# Patient Record
Sex: MALE | Race: BLACK OR AFRICAN AMERICAN | Employment: FULL TIME | URBAN - METROPOLITAN AREA
[De-identification: names, ages, dates, MRNs, and addresses within clinical notes are randomized per-mention and may not be internally consistent; named-entity substitution may affect disease eponyms.]

---

## 2017-02-02 ENCOUNTER — TELEPHONE (OUTPATIENT)
Dept: OTHER | Facility: CLINIC | Age: 37
End: 2017-02-02

## 2017-02-02 NOTE — TELEPHONE ENCOUNTER
2/2/2017    Call Regarding [employer]    Attempt 1    Message on voicemail     Comments: spouse and 3 child dep        Outreach   Amaya Parsons

## 2017-03-08 NOTE — TELEPHONE ENCOUNTER
3/8/2017    Call Regarding [employer]boarding [employer]    Attempt 2    Message on voicemail     Comments: Dep--- spouse and 3 roque        Outreach   cnt

## 2017-06-02 ENCOUNTER — RADIANT APPOINTMENT (OUTPATIENT)
Dept: GENERAL RADIOLOGY | Facility: CLINIC | Age: 37
End: 2017-06-02
Attending: INTERNAL MEDICINE
Payer: COMMERCIAL

## 2017-06-02 ENCOUNTER — OFFICE VISIT (OUTPATIENT)
Dept: PEDIATRICS | Facility: CLINIC | Age: 37
End: 2017-06-02
Payer: COMMERCIAL

## 2017-06-02 VITALS
HEIGHT: 71 IN | BODY MASS INDEX: 23.8 KG/M2 | DIASTOLIC BLOOD PRESSURE: 70 MMHG | HEART RATE: 64 BPM | RESPIRATION RATE: 20 BRPM | SYSTOLIC BLOOD PRESSURE: 112 MMHG | TEMPERATURE: 98.4 F | WEIGHT: 170 LBS

## 2017-06-02 DIAGNOSIS — M53.3 SACROILIAC JOINT PAIN: ICD-10-CM

## 2017-06-02 DIAGNOSIS — M54.5 LOW BACK PAIN, UNSPECIFIED BACK PAIN LATERALITY, UNSPECIFIED CHRONICITY, WITH SCIATICA PRESENCE UNSPECIFIED: ICD-10-CM

## 2017-06-02 DIAGNOSIS — M54.5 LOW BACK PAIN, UNSPECIFIED BACK PAIN LATERALITY, UNSPECIFIED CHRONICITY, WITH SCIATICA PRESENCE UNSPECIFIED: Primary | ICD-10-CM

## 2017-06-02 PROCEDURE — 99213 OFFICE O/P EST LOW 20 MIN: CPT | Performed by: INTERNAL MEDICINE

## 2017-06-02 PROCEDURE — 72200 X-RAY EXAM SI JOINTS: CPT

## 2017-06-02 PROCEDURE — 72100 X-RAY EXAM L-S SPINE 2/3 VWS: CPT

## 2017-06-02 NOTE — MR AVS SNAPSHOT
After Visit Summary   6/2/2017    Ti Peoples    MRN: [id number]           Patient Information     Date Of Birth          1980        Visit Information        Provider Department      6/2/2017 2:40 PM Ovi Hunter MD [facility]        Today's Diagnoses     Low back pain, unspecified back pain laterality, unspecified chronicity, with sciatica presence unspecified    -  1    Sacroiliac joint pain          Care Instructions                     Acute Low Back Pain: Self Care at Home Instructions  Conservative Treatment    Remaining active supports a quicker recovery, keep moving. (ex. Walking, increase time & speed as tolerated).    Bed rest is not recommended. Minimize sitting. Do not remain in one position for extended periods of time.    Maintain routine activity with attention to correct posture; stop aggravating activities.    Over-the-counter pain medications for short term symptom control. (See below)    Muscle relaxants are sometimes helpful for few days, but may cause drowsiness. (See below)    Cold packs or heat based upon your preference.    Most people improve within 2 weeks; most have significant improvement within 4 weeks.    If symptoms worsen or you experience numbness, contact your provider immediately.    Medications for Pain Relief  Recommended over-the-counter non-steroidal anti-inflammatories:     Ibuprofen (Advil, Motrin) 600 mg. three times a day as needed for pain      OR   Naproxen Sodium (Aleve) 220-440 mg. two times per day as needed for pain    If you have been prescribed a muscle relaxant (*cyclobenzapine 10 mg.)  Take    - 1 tablet at bedtime  *May cause drowsiness. Do not drive when taking this medication.    Low Back Pain Exercises   Exercises that stretch and strengthen the muscles of your abdomen and spine can help prevent back problems. If your back and abdominal muscles are strong, you can maintain good posture and keep your spine in its correct  position.     If your muscles are tight, take a warm shower or bath before doing the exercises. Exercise on a rug or mat. Stop doing any exercise that causes pain until you have talked with your provider.     These exercises are intended only as suggestions. Ask your provider or physical therapist to help you develop an exercise program. Check with your provider before starting the exercises. Ask your provider how many times a week you need to do the exercises.     Low Back Pain Exercises                                            Cat and camel: Get down on your hands and knees. Let your stomach sag, allowing your back to curve downward. Hold this position for 5 seconds. Then arch your back and hold for 5 seconds. Do 3 sets of 10.       Pelvic tilt: Lie on your back with your knees bent and your feet flat on the floor. Tighten your abdominal muscles and push your lower back into the floor. Hold this position for 5 seconds, then relax. Do 3 sets of 10.       Extension exercise: Lie face down on the floor for 5 minutes. If this hurts too much, lie face down with a pillow under your stomach. This should relieve your leg or back pain. When you can lie on your stomach for 5 minutes without a pillow, then you can continue with the rest of this exercise.     After lying on your stomach for 5 minutes, prop yourself up on your elbows for another 5 minutes. Lie flat again for 1 minute, then press down on your hands and extend your elbows while keeping your hips flat on the floor. Hold for 1 second and lower yourself to the floor. Repeat 10 times. Do 4 sets. Rest for 2 minutes between sets. You should have no pain in your legs when you do this, but it is normal to feel pain in your lower back. Do this several times a day.          Developed by [employer]   Published by [employer].   Last modified: 2009-02-08   Last reviewed: 2008-07-07   This content is reviewed periodically and is subject to change as new health information  "becomes available. The information is intended to inform and educate and is not a replacement for medical evaluation, advice, diagnosis or treatment by a healthcare professional.   Adult Health Advisor 2009.1 Index  Adult Health Advisor 2009.1 Credits     2009 [facility] and/or its affiliates. All Rights Reserved.             Follow-ups after your visit        Future tests that were ordered for you today     Open Future Orders        Priority Expected Expires Ordered    XR Lumbar Spine 2/3 Views Routine 6/2/2017 6/2/2018 6/2/2017    XR Sacroiliac Joint 1/2 Views Routine 6/2/2017 6/2/2018 6/2/2017            Who to contact     If you have questions or need follow up information about today's clinic visit or your schedule please contact [facility] directly at [phone].  Normal or non-critical lab and imaging results will be communicated to you by Podaddieshart, letter or phone within 4 business days after the clinic has received the results. If you do not hear from us within 7 days, please contact the clinic through Podaddieshart or phone. If you have a critical or abnormal lab result, we will notify you by phone as soon as possible.  Submit refill requests through Add2paper or call your pharmacy and they will forward the refill request to us. Please allow 3 business days for your refill to be completed.          Additional Information About Your Visit        Add2paper Information     Add2paper lets you send messages to your doctor, view your test results, renew your prescriptions, schedule appointments and more. To sign up, go to [email or web address] . Click on \"Log in\" on the left side of the screen, which will take you to the Welcome page. Then click on \"Sign up Now\" on the right side of the page.     You will be asked to enter the access code listed below, as well as some personal information. Please follow the directions to create your username and password.     Your access code is: [email or web address]  Expires: " "2017  3:12 PM     Your access code will  in 90 days. If you need help or a new code, please call your [facility] or [phone].        Care EveryWhere ID     This is your Care EveryWhere ID. This could be used by other organizations to access your [place] medical records  [id number]        Your Vitals Were     Pulse Temperature Respirations Height BMI (Body Mass Index)       64 98.4  F (36.9  C) (Oral) 20 5' 11\" (1.803 m) 23.71 kg/m2        Blood Pressure from Last 3 Encounters:   17 112/70   16 104/72   04/28/15 108/68    Weight from Last 3 Encounters:   17 170 lb (77.1 kg)   16 178 lb 9.6 oz (81 kg)   04/28/15 170 lb 9.6 oz (77.4 kg)               Primary Care Provider Office Phone # Fax #    Ovi Hunter [phone]       [facility] [address] [place] [address]        Thank you!     Thank you for choosing [facility]  for your care. Our goal is always to provide you with excellent care. Hearing back from our patients is one way we can continue to improve our services. Please take a few minutes to complete the written survey that you may receive in the mail after your visit with us. Thank you!             Your Updated Medication List - Protect others around you: Learn how to safely use, store and throw away your medicines at www.disposemymeds.org.      Notice  As of 2017  3:12 PM    You have not been prescribed any medications.      "

## 2017-06-02 NOTE — NURSING NOTE
"Chief Complaint   Patient presents with     Musculoskeletal Problem       Initial /70  Pulse 64  Temp 98.4  F (36.9  C) (Oral)  Resp 20  Ht 5' 11\" (1.803 m)  Wt 170 lb (77.1 kg)  BMI 23.71 kg/m2 Estimated body mass index is 23.71 kg/(m^2) as calculated from the following:    Height as of this encounter: 5' 11\" (1.803 m).    Weight as of this encounter: 170 lb (77.1 kg).  Medication Reconciliation: complete   Liudmila J, CMA,AAMA        "

## 2017-06-02 NOTE — LETTER
[address] [address]                  [phone]   June 13, 2017    Ti Beek  [address] [place] [address]      Mr. Peoples,    Your imaging tests are enclosed. The xrays are within normal limits, this is good news! If you back pain is persisting, please let me know and the next step would be a referral to physical therapy for an evaluation.     Please let me know if you have any questions, concerns, or new issues to discuss.     Ovi Hunter M.D.  Internal Medicine-Pediatrics    Results for orders placed or performed in visit on 06/02/17   XR Sacroiliac Joint 1/2 Views    Narrative    BILATERAL SACROILIAC JOINTS 2 VIEWS  6/2/2017 3:38 PM    HISTORY:  Pain.    COMPARISON:  None.    FINDINGS:  No fracture or osseous lesion is seen. The joint spaces are  well preserved. No adjacent soft tissue pathology is seen.      Impression    IMPRESSION:  Unremarkable examination.    MARIO NGUYEN MD

## 2017-06-02 NOTE — PATIENT INSTRUCTIONS
Acute Low Back Pain: Self Care at Home Instructions  Conservative Treatment    Remaining active supports a quicker recovery, keep moving. (ex. Walking, increase time & speed as tolerated).    Bed rest is not recommended. Minimize sitting. Do not remain in one position for extended periods of time.    Maintain routine activity with attention to correct posture; stop aggravating activities.    Over-the-counter pain medications for short term symptom control. (See below)    Muscle relaxants are sometimes helpful for few days, but may cause drowsiness. (See below)    Cold packs or heat based upon your preference.    Most people improve within 2 weeks; most have significant improvement within 4 weeks.    If symptoms worsen or you experience numbness, contact your provider immediately.    Medications for Pain Relief  Recommended over-the-counter non-steroidal anti-inflammatories:     Ibuprofen (Advil, Motrin) 600 mg. three times a day as needed for pain      OR   Naproxen Sodium (Aleve) 220-440 mg. two times per day as needed for pain    If you have been prescribed a muscle relaxant (*cyclobenzapine 10 mg.)  Take    - 1 tablet at bedtime  *May cause drowsiness. Do not drive when taking this medication.    Low Back Pain Exercises   Exercises that stretch and strengthen the muscles of your abdomen and spine can help prevent back problems. If your back and abdominal muscles are strong, you can maintain good posture and keep your spine in its correct position.     If your muscles are tight, take a warm shower or bath before doing the exercises. Exercise on a rug or mat. Stop doing any exercise that causes pain until you have talked with your provider.     These exercises are intended only as suggestions. Ask your provider or physical therapist to help you develop an exercise program. Check with your provider before starting the exercises. Ask your provider how many times a week you need to do the exercises.      Low Back Pain Exercises                                            Cat and camel: Get down on your hands and knees. Let your stomach sag, allowing your back to curve downward. Hold this position for 5 seconds. Then arch your back and hold for 5 seconds. Do 3 sets of 10.       Pelvic tilt: Lie on your back with your knees bent and your feet flat on the floor. Tighten your abdominal muscles and push your lower back into the floor. Hold this position for 5 seconds, then relax. Do 3 sets of 10.       Extension exercise: Lie face down on the floor for 5 minutes. If this hurts too much, lie face down with a pillow under your stomach. This should relieve your leg or back pain. When you can lie on your stomach for 5 minutes without a pillow, then you can continue with the rest of this exercise.     After lying on your stomach for 5 minutes, prop yourself up on your elbows for another 5 minutes. Lie flat again for 1 minute, then press down on your hands and extend your elbows while keeping your hips flat on the floor. Hold for 1 second and lower yourself to the floor. Repeat 10 times. Do 4 sets. Rest for 2 minutes between sets. You should have no pain in your legs when you do this, but it is normal to feel pain in your lower back. Do this several times a day.          Developed by [employer]   Published by [employer].   Last modified: 2009-02-08   Last reviewed: 2008-07-07   This content is reviewed periodically and is subject to change as new health information becomes available. The information is intended to inform and educate and is not a replacement for medical evaluation, advice, diagnosis or treatment by a healthcare professional.   Adult Health Advisor 2009.1 Index  Adult Health Advisor 2009.1 Credits     2009 [facility] and/or its affiliates. All Rights Reserved.

## 2017-06-02 NOTE — PROGRESS NOTES
"SUBJECTIVE:                                                    Ti Peoples is a 36 year old male who presents to clinic today for the following health issues:    Muscle and joint pain      Duration: -2 months    Description (location/character/radiation): bilateral hip pain    Intensity:  moderate    Accompanying signs and symptoms: off/on    History (similar episodes/previous evaluation): none contributing factors    Precipitating or alleviating factors: None    Therapies tried and outcome: asprin was effective     patient here for back pain, has had for about a year. not sure what activates it, but when it flares he is very hurts. is able to work when he has it, getting out of bed and going down the stairs hurts too. sometimes better after sleeping, sometimes comes and goes. did try ibuprofen once and it did seem to help that day. no history of injury or trauma. gets a few times a month, no swelling. nno numbness or tingling in the legs. patient has no symptoms today, does get symptoms a fw times a month.       Problem list and histories reviewed & adjusted, as indicated.  Additional history: as documented    Patient Active Problem List    Diagnosis Date Noted     CARDIOVASCULAR SCREENING; LDL GOAL LESS THAN 160 10/31/2010     Social History   Substance Use Topics     Smoking status: Never Smoker     Smokeless tobacco: Never Used      Comment: second hand smoke     Alcohol use Yes      Comment: socially     Family History   Problem Relation Age of Onset     CANCER Maternal Grandmother        ROS:  A complete 10 point review of systems was taken and negative except for those noted in the subjective/HPI section(s) above     Problem list, Medication list, Allergies, and Medical/Social/Surgical histories reviewed in EPIC and updated as appropriate.    OBJECTIVE:                                                    /70  Pulse 64  Temp 98.4  F (36.9  C) (Oral)  Resp 20  Ht 5' 11\" (1.803 m)  Wt 170 lb (77.1 kg) "  BMI 23.71 kg/m2   General examGENERAL APPEARANCE: healthy, alert and no distress, PSYCH: Affect Normal/Bright.  Mentation within normal limits, NECK: supple, non-tender to palpation, no adenopathy noted,   Back Exam: normal, full range of motion. no pain with palpation. no pain with straight leg raise. DTR's, motor strength and sensation normal, including heel and toe gait.  Peripheral pulses are palpable.     LUMBAR SPINE TWO-THREE VIEWS  6/2/2017 3:39 PM      HISTORY: Low back pain, suspect sacroiliac joint issue. Low back pain.  Sacrococcygeal disorders, not elsewhere classified.     COMPARISON: None.         IMPRESSION: Lumbar vertebrae are normally aligned. No compression  deformity or acute fracture. SI joints intact.     PORFIRIO MEJIA MD        BILATERAL SACROILIAC JOINTS 2 VIEWS  6/2/2017 3:38 PM     HISTORY:  Pain.     COMPARISON:  None.     FINDINGS:  No fracture or osseous lesion is seen. The joint spaces are  well preserved. No adjacent soft tissue pathology is seen.         IMPRESSION:  Unremarkable examination.     MAROI NGUYEN MD     ASSESSMENT/PLAN:                                                        ICD-10-CM    1. Low back pain, unspecified back pain laterality, unspecified chronicity, with sciatica presence unspecified M54.5 XR Lumbar Spine 2/3 Views     XR Sacroiliac Joint 1/2 Views   2. Sacroiliac joint pain M53.3 XR Lumbar Spine 2/3 Views     XR Sacroiliac Joint 1/2 Views   discussed with patient (or patient's parents/caregiver) pathophysiology of condition and treatment options.  will get xrays to evaluation for spondyarthropahy, et given patient's age. likely strain and reviewed supportive cares, RICE, etc. if persits, physical therapy evaluation would be good next step. if worse would refer to ortho for evaluation. Patient verbalized understanding and is agreeable to this plan.         Estimated body mass index is 23.71 kg/(m^2) as calculated from the following:    Height as of  "this encounter: 5' 11\" (1.803 m).    Weight as of this encounter: 170 lb (77.1 kg).      Return to clinic as needed or if symptoms persist, change, worsen or if any new symptoms develop.    Ovi Hunter M.D.  Internal Medicine-Pediatrics              "

## 2018-05-01 ENCOUNTER — VIRTUAL VISIT (OUTPATIENT)
Dept: FAMILY MEDICINE | Facility: OTHER | Age: 38
End: 2018-05-01

## 2018-05-01 ENCOUNTER — TELEPHONE (OUTPATIENT)
Dept: PEDIATRICS | Facility: CLINIC | Age: 38
End: 2018-05-01

## 2018-05-01 DIAGNOSIS — H10.32 ACUTE CONJUNCTIVITIS OF LEFT EYE: Primary | ICD-10-CM

## 2018-05-01 RX ORDER — POLYMYXIN B SULFATE AND TRIMETHOPRIM 1; 10000 MG/ML; [USP'U]/ML
1 SOLUTION OPHTHALMIC 4 TIMES DAILY
Qty: 10 ML | Refills: 0 | Status: SHIPPED | OUTPATIENT
Start: 2018-05-01 | End: 2018-05-08

## 2018-05-01 NOTE — PROGRESS NOTES
"Date:   Clinician: Nallely De Jesus  Clinician NPI: [id number]  Patient: Ti Peoples  Patient : 1980  Patient Address: [address] [place] [address]  Patient Phone: [phone]  Visit Protocol: Eye conditions  Patient Summary:  Ti is a 37 year old (: 1980 ) male who initiated a Visit for conjunctivitis.  When asked the question \"Please sign me up to receive news, health information and promotions from [employer].\", Ti responded \"No\".    Images of his eye condition were uploaded.   His symptoms started 1-2 days ago and affect the left eye. The symptoms consist of eye pain, drainage coming from the eye(s), eyelid swelling, and eye redness. Additionally, his vision has become more blurry since his symptoms started, but it's possible the blurry vision is caused by the eyelid swelling and/or drainage coming from the eye(s).   Symptom details     Drainage: The color of the drainage coming out of his eye(s) is yellow. The drainage is thick and causes his eyelids to be stuck shut in the morning.    Eye pain: He is experiencing mild eye pain. He has not taken over-the-counter medication for the pain.     Denied symptoms include itchy eye(s), light sensitivity, and bumps on the eyelid. Ti does not have subconjunctival hemorrhage. He does not feel feverish.   In the past 2 weeks, he has had a cold or an ear infection.   Ti has not had a recent diagnosis of conjunctivitis. He also has not had a recent eye injury, foreign body in the eye(s), and eye surgery. It is not known if Ti has recently been exposed to someone with a red eye or an eye infection. He does not wear contact lenses. Ti has not ever been diagnosed with glaucoma.   Ti is not taking medication to treat his current symptoms.   Ti does not require proof of evaluation of his eye condition before returning to school, work, or .   Ti does not smoke or use smokeless tobacco.   Additional " information as reported by the patient (free text): Irritation started after swimming in the ocean and getting salt water in the eyes  MEDICATIONS:  No current medications  , ALLERGIES:   NKDA    Clinician Response:  Dear Ti,  I am sorry you are not feeling well. Your health is our priority. To determine the most appropriate care for you, I would like you to be seen in person to further discuss your health history and symptoms.  You will not be charged for this Visit. Thank you for trusting us with your care.   Diagnosis: Refer for additional evaluation  Diagnosis ICD: R69  Diagnosis ICD: 462.0

## 2018-05-01 NOTE — TELEPHONE ENCOUNTER
Wife sent [employer] message regarding  in her account/chart.     Called wife, patient is out fishing.     Reviewed [facility] symptoms review with wife. She reports no change in symptoms other than he is trying not to rub his eye and irritate it further.     Will discuss with Dr. Hunter.

## 2018-06-20 ENCOUNTER — HOSPITAL ENCOUNTER (EMERGENCY)
Facility: CLINIC | Age: 38
Discharge: HOME | End: 2018-06-20
Attending: EMERGENCY MEDICINE | Admitting: EMERGENCY MEDICINE
Payer: COMMERCIAL

## 2018-06-20 ENCOUNTER — APPOINTMENT (OUTPATIENT)
Dept: GENERAL RADIOLOGY | Facility: CLINIC | Age: 38
End: 2018-06-20
Attending: EMERGENCY MEDICINE
Payer: COMMERCIAL

## 2018-06-20 VITALS
RESPIRATION RATE: 18 BRPM | HEIGHT: 71 IN | TEMPERATURE: 100.3 F | BODY MASS INDEX: 23.1 KG/M2 | SYSTOLIC BLOOD PRESSURE: 117 MMHG | HEART RATE: 74 BPM | WEIGHT: 165 LBS | OXYGEN SATURATION: 98 % | DIASTOLIC BLOOD PRESSURE: 71 MMHG

## 2018-06-20 DIAGNOSIS — R50.9 FEVER, UNSPECIFIED FEVER CAUSE: ICD-10-CM

## 2018-06-20 DIAGNOSIS — M79.10 MYALGIA: ICD-10-CM

## 2018-06-20 LAB
A PHAGOCYTOPH IGG TITR SER IF: NORMAL {TITER}
A PHAGOCYTOPH IGM TITR SER IF: NORMAL {TITER}
ALBUMIN SERPL-MCNC: 3.2 G/DL (ref 3.4–5)
ALBUMIN UR-MCNC: >499 MG/DL
ALP SERPL-CCNC: 112 U/L (ref 40–150)
ALT SERPL W P-5'-P-CCNC: 59 U/L (ref 0–70)
ANION GAP SERPL CALCULATED.3IONS-SCNC: 8 MMOL/L (ref 3–14)
APPEARANCE UR: ABNORMAL
AST SERPL W P-5'-P-CCNC: 54 U/L (ref 0–45)
BASOPHILS # BLD AUTO: 0 10E9/L (ref 0–0.2)
BASOPHILS NFR BLD AUTO: 0 %
BILIRUB DIRECT SERPL-MCNC: 0.3 MG/DL (ref 0–0.2)
BILIRUB SERPL-MCNC: 1.1 MG/DL (ref 0.2–1.3)
BILIRUB UR QL STRIP: ABNORMAL
BUN SERPL-MCNC: 12 MG/DL (ref 7–30)
CALCIUM SERPL-MCNC: 8.5 MG/DL (ref 8.5–10.1)
CHLORIDE SERPL-SCNC: 99 MMOL/L (ref 94–109)
CO2 BLDCOV-SCNC: 26 MMOL/L (ref 21–28)
CO2 SERPL-SCNC: 24 MMOL/L (ref 20–32)
COLOR UR AUTO: ABNORMAL
CREAT SERPL-MCNC: 0.93 MG/DL (ref 0.66–1.25)
DIFFERENTIAL METHOD BLD: ABNORMAL
EOSINOPHIL # BLD AUTO: 0 10E9/L (ref 0–0.7)
EOSINOPHIL NFR BLD AUTO: 0 %
ERYTHROCYTE [DISTWIDTH] IN BLOOD BY AUTOMATED COUNT: 13.3 % (ref 10–15)
GFR SERPL CREATININE-BSD FRML MDRD: >90 ML/MIN/1.7M2
GLUCOSE SERPL-MCNC: 119 MG/DL (ref 70–99)
GLUCOSE UR STRIP-MCNC: NEGATIVE MG/DL
HCT VFR BLD AUTO: 42.4 % (ref 40–53)
HGB BLD-MCNC: 14.6 G/DL (ref 13.3–17.7)
HGB UR QL STRIP: ABNORMAL
KETONES UR STRIP-MCNC: 20 MG/DL
LACTATE BLD-SCNC: 0.8 MMOL/L (ref 0.7–2)
LACTATE BLD-SCNC: 2.1 MMOL/L (ref 0.7–2.1)
LEUKOCYTE ESTERASE UR QL STRIP: NEGATIVE
LIPASE SERPL-CCNC: 84 U/L (ref 73–393)
LYMPHOCYTES # BLD AUTO: 0.4 10E9/L (ref 0.8–5.3)
LYMPHOCYTES NFR BLD AUTO: 12 %
MCH RBC QN AUTO: 30.5 PG (ref 26.5–33)
MCHC RBC AUTO-ENTMCNC: 34.4 G/DL (ref 31.5–36.5)
MCV RBC AUTO: 89 FL (ref 78–100)
METAMYELOCYTES # BLD: 0 10E9/L
METAMYELOCYTES NFR BLD MANUAL: 1 %
MONOCYTES # BLD AUTO: 0.1 10E9/L (ref 0–1.3)
MONOCYTES NFR BLD AUTO: 4 %
MYELOCYTES # BLD: 0 10E9/L
MYELOCYTES NFR BLD MANUAL: 1 %
NEUTROPHILS # BLD AUTO: 2.6 10E9/L (ref 1.6–8.3)
NEUTROPHILS NFR BLD AUTO: 82 %
NITRATE UR QL: NEGATIVE
PCO2 BLDV: 31 MM HG (ref 40–50)
PH BLDV: 7.52 PH (ref 7.32–7.43)
PH UR STRIP: 8 PH (ref 5–7)
PLATELET # BLD AUTO: 65 10E9/L (ref 150–450)
PLATELET # BLD EST: ABNORMAL 10*3/UL
PO2 BLDV: 20 MM HG (ref 25–47)
POTASSIUM SERPL-SCNC: 3.4 MMOL/L (ref 3.4–5.3)
POTASSIUM SERPL-SCNC: ABNORMAL MMOL/L (ref 3.4–5.3)
PROT SERPL-MCNC: 6.1 G/DL (ref 6.8–8.8)
RBC # BLD AUTO: 4.78 10E12/L (ref 4.4–5.9)
RBC #/AREA URNS AUTO: >182 /HPF (ref 0–2)
RBC MORPH BLD: ABNORMAL
SAO2 % BLDV FROM PO2: 41 %
SODIUM SERPL-SCNC: 131 MMOL/L (ref 133–144)
SOURCE: ABNORMAL
SP GR UR STRIP: 1.03 (ref 1–1.03)
UROBILINOGEN UR STRIP-MCNC: 4 MG/DL (ref 0–2)
WBC # BLD AUTO: 3.2 10E9/L (ref 4–11)
WBC #/AREA URNS AUTO: 3 /HPF (ref 0–5)

## 2018-06-20 PROCEDURE — 25000128 H RX IP 250 OP 636: Performed by: EMERGENCY MEDICINE

## 2018-06-20 PROCEDURE — 96374 THER/PROPH/DIAG INJ IV PUSH: CPT

## 2018-06-20 PROCEDURE — 87040 BLOOD CULTURE FOR BACTERIA: CPT | Performed by: EMERGENCY MEDICINE

## 2018-06-20 PROCEDURE — 80076 HEPATIC FUNCTION PANEL: CPT | Performed by: EMERGENCY MEDICINE

## 2018-06-20 PROCEDURE — 36415 COLL VENOUS BLD VENIPUNCTURE: CPT | Performed by: EMERGENCY MEDICINE

## 2018-06-20 PROCEDURE — 83605 ASSAY OF LACTIC ACID: CPT

## 2018-06-20 PROCEDURE — 96375 TX/PRO/DX INJ NEW DRUG ADDON: CPT

## 2018-06-20 PROCEDURE — 81001 URINALYSIS AUTO W/SCOPE: CPT | Performed by: EMERGENCY MEDICINE

## 2018-06-20 PROCEDURE — 25000132 ZZH RX MED GY IP 250 OP 250 PS 637: Performed by: EMERGENCY MEDICINE

## 2018-06-20 PROCEDURE — 71046 X-RAY EXAM CHEST 2 VIEWS: CPT

## 2018-06-20 PROCEDURE — 96361 HYDRATE IV INFUSION ADD-ON: CPT

## 2018-06-20 PROCEDURE — 85025 COMPLETE CBC W/AUTO DIFF WBC: CPT | Performed by: EMERGENCY MEDICINE

## 2018-06-20 PROCEDURE — 80048 BASIC METABOLIC PNL TOTAL CA: CPT | Performed by: EMERGENCY MEDICINE

## 2018-06-20 PROCEDURE — 83690 ASSAY OF LIPASE: CPT | Performed by: EMERGENCY MEDICINE

## 2018-06-20 PROCEDURE — 86666 EHRLICHIA ANTIBODY: CPT | Performed by: EMERGENCY MEDICINE

## 2018-06-20 PROCEDURE — 99284 EMERGENCY DEPT VISIT MOD MDM: CPT | Mod: 25

## 2018-06-20 PROCEDURE — 82803 BLOOD GASES ANY COMBINATION: CPT

## 2018-06-20 PROCEDURE — 84132 ASSAY OF SERUM POTASSIUM: CPT | Performed by: EMERGENCY MEDICINE

## 2018-06-20 PROCEDURE — 83605 ASSAY OF LACTIC ACID: CPT | Performed by: EMERGENCY MEDICINE

## 2018-06-20 RX ORDER — DOXYCYCLINE HYCLATE 100 MG
100 TABLET ORAL ONCE
Status: COMPLETED | OUTPATIENT
Start: 2018-06-20 | End: 2018-06-20

## 2018-06-20 RX ORDER — ACETAMINOPHEN 500 MG
1000 TABLET ORAL ONCE
Status: COMPLETED | OUTPATIENT
Start: 2018-06-20 | End: 2018-06-20

## 2018-06-20 RX ORDER — KETOROLAC TROMETHAMINE 15 MG/ML
15 INJECTION, SOLUTION INTRAMUSCULAR; INTRAVENOUS ONCE
Status: COMPLETED | OUTPATIENT
Start: 2018-06-20 | End: 2018-06-20

## 2018-06-20 RX ORDER — ONDANSETRON 2 MG/ML
4 INJECTION INTRAMUSCULAR; INTRAVENOUS
Status: DISCONTINUED | OUTPATIENT
Start: 2018-06-20 | End: 2018-06-20 | Stop reason: HOSPADM

## 2018-06-20 RX ORDER — DOXYCYCLINE 100 MG/1
100 CAPSULE ORAL 2 TIMES DAILY
Qty: 20 CAPSULE | Refills: 0 | Status: SHIPPED | OUTPATIENT
Start: 2018-06-20 | End: 2018-06-30

## 2018-06-20 RX ADMIN — ONDANSETRON 4 MG: 2 INJECTION INTRAMUSCULAR; INTRAVENOUS at 07:42

## 2018-06-20 RX ADMIN — KETOROLAC TROMETHAMINE 15 MG: 15 INJECTION, SOLUTION INTRAMUSCULAR; INTRAVENOUS at 07:42

## 2018-06-20 RX ADMIN — ACETAMINOPHEN 1000 MG: 500 TABLET, FILM COATED ORAL at 08:00

## 2018-06-20 RX ADMIN — SODIUM CHLORIDE 1000 ML: 9 INJECTION, SOLUTION INTRAVENOUS at 07:45

## 2018-06-20 RX ADMIN — DOXYCYCLINE HYCLATE 100 MG: 100 TABLET, FILM COATED ORAL at 09:54

## 2018-06-20 RX ADMIN — SODIUM CHLORIDE 1000 ML: 9 INJECTION, SOLUTION INTRAVENOUS at 09:53

## 2018-06-20 ASSESSMENT — ENCOUNTER SYMPTOMS
FEVER: 1
RHINORRHEA: 0
ABDOMINAL PAIN: 1
CONSTIPATION: 0
DIAPHORESIS: 1
NAUSEA: 1
DYSURIA: 0
HEMATURIA: 0
VOMITING: 0
DIARRHEA: 0
ARTHRALGIAS: 1
COUGH: 0
HEADACHES: 1
MYALGIAS: 1

## 2018-06-20 NOTE — LETTER
June 20, 2018      To Whom It May Concern:      Ti Peoples was seen in our Emergency Department today, 06/20/18.  I expect his condition to improve over the next 2-3 days.  He may return to work when improved.    Sincerely,        Araceli Johnston RN

## 2018-06-20 NOTE — ED AVS SNAPSHOT
[facility] Emergency Department    [address] [place] [address]    Phone:  [phone]    Fax:  [phone]                                       Ti Peoples   MRN: [id number]    Department:  [facility] Emergency Department   Date of Visit:  6/20/2018           Patient Information     Date Of Birth          1980        Your diagnoses for this visit were:     Fever, unspecified fever cause possible Anaplasmosis    Myalgia        You were seen by Juan Alberto Carr MD.      Follow-up Information     Follow up with Ovi Hunter MD In 2 days.    Specialties:  Internal Medicine, Pediatrics    Contact information:    [facility] [place] [address]  [phone]          Discharge Instructions         Tick Facts    Ticks are small arachnids that feed on the blood of rodents, rabbits, birds, deer, dogs, and humans. Ticks do not fly and do not drop from trees. They climb to the tips of plants along trails and attach themselves to you as you brush against the plant. Ticks may also attach to you if you come in contact with an infested animal.  Avoiding ticks  The following tips will help you avoid ticks:    When walking in tick-infested areas, tuck your pants into your boots or socks, and tuck your shirt into your pants.    Wear light-colored clothing so you can easily see any ticks that get on you.    Apply a tick repellent to pants, socks, and shoes.    Avoid brushing against the plants along trails.    Check yourself and your children at the end of each day when hiking through tick-infested areas. Don't forget to check in your hair as well.  Removing ticks  Removing ticks right away will reduce the chance of disease. Here are some tips for removing ticks:    If possible, have someone else remove the tick from you.    Protect your hands from exposure to the tick by using a tissue or rubber glove.    Ticks have hook-like barbs on their mouth,  which they use to attach themselves. Use tweezers or small needle-nose pliers instead of your fingers when removing a tick. Grasp the head as close to the skin as possible. Be careful not to squeeze the body, which would inject more fluid from the tick into your skin.    Pull gently and slowly away from skin until the mouthparts let go. If the tick fails to let go, stop pulling. While holding the head with tweezers, slowly turn it 90 degrees. Then, gently pull away from the skin again. This movement may unlock the tick's jaw and make it easier to remove.    Once you have removed the tick, look closely at the bite area. If you think there are still parts of the tick in your skin that you can't remove, contact your doctor.    Wash your hands and the bite site with soap and water.  Do not:    Crush or squeeze the tick with the tweezers.    Jerk the tick.    Burn or prick the tick.    Try to suffocate the tick with petroleum jelly or nail polish.  Identifying ticks  Most tick bites are harmless. But some ticks carry diseases, such as Lyme disease and Jarek Mountain spotted fever. These can spread to people. Lyme disease is of greatest concern. It is carried by only one type of tick, the deer tick. Many public health departments are able to identify a tick to figure out if it is the type that causes Lyme disease. If this service is available in your area, bring the removed tick in a zip-top bag or jar to the public health department for identification. If you cannot identify the tick and if you were bitten in an area of the country where there is a risk of Lyme disease, contact your doctor.  Date Last Reviewed: 9/1/2016 2000-2017 [employer]. [address] [place] [address], [place] [address]. All rights reserved. This information is not intended as a substitute for professional medical care. Always follow your healthcare professional's instructions.      Discharge Instructions  Fever    You have been seen today  for a fever. Fever is a normal body reaction to illness or inflammation. Fever is a sign that your body is doing what it should to fight something off. Fever is not dangerous, but it can make you feel miserable, and you will probably feel better if you get your fever to go down. Most infections are caused by a virus, and antibiotics will not help; your provider will tell you whether antibiotics are needed in your case. At this time your provider does not find that your fever is a sign of anything dangerous or life-threatening.  However, sometimes the signs of serious illness do not show up right away so additional care may be necessary.    Generally, every Emergency Department visit should have a follow-up clinic visit with either a primary or a specialty clinic/provider. Please follow-up as instructed by your emergency provider today.    What can I do to help myself?    Fill any prescriptions the provider gave you and take them right away--especially antibiotics.    If you have a fever, get plenty of rest and drink lots of fluids, especially water.    What clothes or blankets you have on will not change your fever. Do what is comfortable for you.    Bathing or sponging in lukewarm water may help you feel better.    Tylenol  (acetaminophen), Motrin  (ibuprofen), or Advil  (ibuprofen) help bring fever down and may help you feel more comfortable. Be sure to read and follow the package directions, and ask your provider if you have questions.    Do not drink alcohol.    Return to the Emergency Department if:    Any of the symptoms you have get much worse.    You seem very sick, like being too weak to get up.    You have any new symptoms, especially serious things like abdominal (belly) pain or chest pain.    You are short of breath.    You have a severe headache.    You are vomiting (throwing up) so much you cannot keep fluids or medicines down.    You have confusion or seem unusually drowsy.    You have a  seizure.    You have anything else that worries you.  If you were given a prescription for medicine here today, be sure to read all of the information (including the package insert) that comes with your prescription.  This will include important information about the medicine, its side effects, and any warnings that you need to know about.  The pharmacist who fills the prescription can provide more information and answer questions you may have about the medicine.  If you have questions or concerns that the pharmacist cannot address, please call or return to the Emergency Department.   Remember that you can always come back to the Emergency Department if you are not able to see your regular provider in the amount of time listed above, if you get any new symptoms, or if there is anything that worries you.        24 Hour Appointment Hotline       To make an appointment at any [facility], call [phone] ([phone]). If you don't have a family doctor or clinic, we will help you find one. [facility] clinics are conveniently located to serve the needs of you and your family.             Review of your medicines      START taking        Dose / Directions Last dose taken    doxycycline 100 MG capsule   Commonly known as:  VIBRAMYCIN   Dose:  100 mg   Quantity:  20 capsule        Take 1 capsule (100 mg) by mouth 2 times daily for 10 days   Refills:  0                Prescriptions were sent or printed at these locations (1 Prescription)                   Other Prescriptions                Printed at Department/Unit printer (1 of 1)         doxycycline (VIBRAMYCIN) 100 MG capsule                Procedures and tests performed during your visit     Procedure/Test Number of Times Performed    Anaplasma phagocytoph antibody IgG IgM 2    Basic metabolic panel (BMP) 1    Blood culture 2    CBC + differential 1    Hepatic panel 1    ISTAT CG4 gases lactate didier nursing POCT 1    ISTAT gases lactate didier POCT 1    Lactic  acid whole blood 1    Lipase 1    Potassium 1    UA with Microscopic 1    XR Chest 2 Views 1      Orders Needing Specimen Collection     None      Pending Results     Date and Time Order Name Status Description    6/20/2018 0918 Anaplasma phagocytoph antibody IgG IgM In process     6/20/2018 0748 Blood culture In process     6/20/2018 0721 Blood culture In process             Pending Culture Results     Date and Time Order Name Status Description    6/20/2018 0748 Blood culture In process     6/20/2018 0721 Blood culture In process             Pending Results Instructions     If you had any lab results that were not finalized at the time of your Discharge, you can call the ED Lab Result RN at [phone]. You will be contacted by this team for any positive Lab results or changes in treatment. The nurses are available 7 days a week from 10A to 6:30P.  You can leave a message 24 hours per day and they will return your call.        Test Results From Your Hospital Stay        6/20/2018  9:09 AM      Component Results     Component Value Ref Range & Units Status    WBC 3.2 (L) 4.0 - 11.0 10e9/L Final    RBC Count 4.78 4.4 - 5.9 10e12/L Final    Hemoglobin 14.6 13.3 - 17.7 g/dL Final    Hematocrit 42.4 40.0 - 53.0 % Final    MCV 89 78 - 100 fl Final    MCH 30.5 26.5 - 33.0 pg Final    MCHC 34.4 31.5 - 36.5 g/dL Final    RDW 13.3 10.0 - 15.0 % Final    Platelet Count 65 (L) 150 - 450 10e9/L Final    Diff Method Manual Differential  Final    % Neutrophils 82.0 % Final    Increased Bands    % Lymphocytes 12.0 % Final    % Monocytes 4.0 % Final    % Eosinophils 0.0 % Final    % Basophils 0.0 % Final    % Metamyelocytes 1.0 % Final    % Myelocytes 1.0 % Final    Absolute Neutrophil 2.6 1.6 - 8.3 10e9/L Final    Absolute Lymphocytes 0.4 (L) 0.8 - 5.3 10e9/L Final    Absolute Monocytes 0.1 0.0 - 1.3 10e9/L Final    Absolute Eosinophils 0.0 0.0 - 0.7 10e9/L Final    Absolute Basophils 0.0 0.0 - 0.2 10e9/L Final    Absolute  Metamyelocytes 0.0 0 10e9/L Final    Absolute Myelocytes 0.0 0 10e9/L Final    RBC Morphology   Final    Consistent with reported results    Platelet Estimate   Final    Automated count confirmed.  Platelet morphology is normal.         6/20/2018  8:46 AM      Component Results     Component Value Ref Range & Units Status    Sodium 131 (L) 133 - 144 mmol/L Final    Potassium Canceled, Test credited 3.4 - 5.3 mmol/L Final    Unsatisfactory specimen - hemolyzed    Chloride 99 94 - 109 mmol/L Final    Carbon Dioxide 24 20 - 32 mmol/L Final    Anion Gap 8 3 - 14 mmol/L Final    Glucose 119 (H) 70 - 99 mg/dL Final    Urea Nitrogen 12 7 - 30 mg/dL Final    Creatinine 0.93 0.66 - 1.25 mg/dL Final    GFR Estimate >90 >60 mL/min/1.7m2 Final    Non  GFR Calc    GFR Estimate If Black >90 >60 mL/min/1.7m2 Final    African American GFR Calc    Calcium 8.5 8.5 - 10.1 mg/dL Final         6/20/2018  8:23 AM      Narrative     XR CHEST 2 VW  6/20/2018 8:11 AM    HISTORY:  Fever.    COMPARISON:  None.        Impression     IMPRESSION:  Negative.     DEEPAK RAIN MD         6/20/2018  9:34 AM      Component Results     Component Value Ref Range & Units Status    Color Urine Brown  Final    Appearance Urine Cloudy  Final    Glucose Urine Negative NEG^Negative mg/dL Final    Bilirubin Urine Small (A) NEG^Negative Final    This is an unconfirmed screening test result. A positive result may be false.    Ketones Urine 20 (A) NEG^Negative mg/dL Final    Specific Gravity Urine 1.029 1.003 - 1.035 Final    Blood Urine Large (A) NEG^Negative Final    pH Urine 8.0 (H) 5.0 - 7.0 pH Final    Protein Albumin Urine >499 (A) NEG^Negative mg/dL Final    Reviewed, acceptable    Urobilinogen mg/dL 4.0 (H) 0.0 - 2.0 mg/dL Final    Nitrite Urine Negative NEG^Negative Final    Leukocyte Esterase Urine Negative NEG^Negative Final    Source Midstream Urine  Final    WBC Urine 3 0 - 5 /HPF Final    RBC Urine >182 (H) 0 - 2 /HPF Final          6/20/2018  8:08 AM         6/20/2018  8:06 AM               6/20/2018  7:53 AM      Component Results     Component Value Ref Range & Units Status    Ph Venous 7.52 (H) 7.32 - 7.43 pH Final    PCO2 Venous 31 (L) 40 - 50 mm Hg Final    PO2 Venous 20 (L) 25 - 47 mm Hg Final    Bicarbonate Venous 26 21 - 28 mmol/L Final    O2 Sat Venous 41 % Final    Lactic Acid 2.1 0.7 - 2.1 mmol/L Final         6/20/2018  9:49 AM      Component Results     Component Value Ref Range & Units Status    Bilirubin Direct 0.3 (H) 0.0 - 0.2 mg/dL Final    Bilirubin Total 1.1 0.2 - 1.3 mg/dL Final    Albumin 3.2 (L) 3.4 - 5.0 g/dL Final    Protein Total 6.1 (L) 6.8 - 8.8 g/dL Final    Alkaline Phosphatase 112 40 - 150 U/L Final    ALT 59 0 - 70 U/L Final    AST 54 (H) 0 - 45 U/L Final         6/20/2018  9:49 AM      Component Results     Component Value Ref Range & Units Status    Lipase 84 73 - 393 U/L Final         6/20/2018  9:47 AM      Component Results     Component Value Ref Range & Units Status    Potassium 3.4 3.4 - 5.3 mmol/L Final               6/20/2018  9:30 AM      Component Results     Component    A Phagocytophil IgG    Canceled, Test credited    Comment:    Unsatisfactory specimen - hemolyzed    A Phagocytophil IgM    Canceled, Test credited    Comment:    Unsatisfactory specimen - hemolyzed         6/20/2018  9:29 AM      Component Results     Component Value Ref Range & Units Status    Lactic Acid 0.8 0.7 - 2.0 mmol/L Final               6/20/2018 10:42 AM                Clinical Quality Measure: Blood Pressure Screening     Your blood pressure was checked while you were in the emergency department today. The last reading we obtained was  BP: 112/71 . Please read the guidelines below about what these numbers mean and what you should do about them.  If your systolic blood pressure (the top number) is less than 120 and your diastolic blood pressure (the bottom number) is less than 80, then your blood pressure is normal.  "There is nothing more that you need to do about it.  If your systolic blood pressure (the top number) is 120-139 or your diastolic blood pressure (the bottom number) is 80-89, your blood pressure may be higher than it should be. You should have your blood pressure rechecked within a year by a primary care provider.  If your systolic blood pressure (the top number) is 140 or greater or your diastolic blood pressure (the bottom number) is 90 or greater, you may have high blood pressure. High blood pressure is treatable, but if left untreated over time it can put you at risk for heart attack, stroke, or kidney failure. You should have your blood pressure rechecked by a primary care provider within the next 4 weeks.  If your provider in the emergency department today gave you specific instructions to follow-up with your doctor or provider even sooner than that, you should follow that instruction and not wait for up to 4 weeks for your follow-up visit.        Thank you for choosing [facility]       Thank you for choosing [facility] for your care. Our goal is always to provide you with excellent care. Hearing back from our patients is one way we can continue to improve our services. Please take a few minutes to complete the written survey that you may receive in the mail after you visit with us. Thank you!        [employer] Information     [employer] lets you send messages to your doctor, view your test results, renew your prescriptions, schedule appointments and more. To sign up, go to [email or web address] . Click on \"Log in\" on the left side of the screen, which will take you to the Welcome page. Then click on \"Sign up Now\" on the right side of the page.     You will be asked to enter the access code listed below, as well as some personal information. Please follow the directions to create your username and password.     Your access code is: [email or web address]  Expires: 2018 10:51 AM     Your access code will  in 90 days. " If you need help or a new code, please call your [place] clinic or [phone].        Care EveryWhere ID     This is your Care EveryWhere ID. This could be used by other organizations to access your [place] medical records  [id number]        Equal Access to Services     HESHAM ALCANTARA : Hawa Peralta, wajacquesda luqadaha, qaybta kaalmada lm, sondra rose. So [facility] [phone].    ATENCIÓN: Si habla español, tiene a jackson disposición servicios gratuitos de asistencia lingüística. Llame al [phone].    We comply with applicable federal civil rights laws and [place] laws. We do not discriminate on the basis of race, color, national origin, age, disability, sex, sexual orientation, or gender identity.            After Visit Summary       This is your record. Keep this with you and show to your community pharmacist(s) and doctor(s) at your next visit.

## 2018-06-20 NOTE — ED PROVIDER NOTES
History     Chief Complaint:  Fever    HPI   Ti Peoples is a 37 year old male who presents with fever    Sunday, came home had soup. Went to bed. Tylenol ibuprofen every few hours. The patient reports that he began feeling ill 4 days ago, when he came home from a weekend of work, ate dinner and went to bed. He has not eaten since. Last night he endorsed a fever of 101.3 F and this morning a fever of 102.4 F, prompting his visit to the ED. 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 He presents today with additional symptoms of mid-abdominal pain, occasional nausea, headache, general aches and sweating and he states that his stomach pain is exacerbated with movement. The patient denies diarrhea, vomiting, dysuria, hematuria, rash and sore throat.     Allergies:  No known allergies     Medications:    The patient is currently on no regular medications.    Past Medical History:    The patient does not have any past pertinent medical history.    Past Surgical History:    History reviewed. No pertinent surgical history.    Family History:    Cancer    Social History:  The patient was accompanied to the ED by his family.  Smoking Status: Never  Smokeless Tobacco: No   Alcohol Use: Yes  Marital Status:        Review of Systems   Constitutional: Positive for diaphoresis and fever.   HENT: Negative for rhinorrhea.    Respiratory: Negative for cough.    Gastrointestinal: Positive for abdominal pain and nausea. Negative for constipation, diarrhea and vomiting.   Genitourinary: Negative for dysuria and hematuria.   Musculoskeletal: Positive for arthralgias and myalgias.   Skin: Negative for rash.   Neurological: Positive for headaches.   All other systems reviewed and are negative.    Physical Exam   First Vitals:  BP: 127/85  Pulse: 100  Temp: 100  F (37.8  " C)  Resp: 18  Height: 180.3 cm (5' 11\")  Weight: 74.8 kg (165 lb)  SpO2: 100 %    Physical Exam  Constitutional: Alert, attentive  HENT:    Nose: Nose normal.    Mouth/Throat: Oropharynx is clear, mucous membranes are moist   Eyes: EOM are normal.   CV: regular rate and rhythm; no murmurs, rubs or gallups  Chest: Effort normal and breath sounds normal.   GI:  There is no tenderness. No distension. Normal bowel sounds  MSK: Normal range of motion.   Neurological: Alert, attentive  Skin: Skin is warm and dry.    Emergency Department Course     Imaging:  Radiology findings were communicated with the patient and family who voiced understanding of the findings.  XR Chest 2 Views  IMPRESSION:  Negative.     DEEPAK RAIN MD    Laboratory:  Laboratory findings were communicated with the patient and family who voiced understanding of the findings.  CBC: WBC 3.2(L), PLT 65(L)  o/w WNL (HGB 14.6)   BMP: (L), Glucose 119(H) o/w WNL (Creatinine 0.93)  Blood Culture x2: Pending  Potassium 3.4  0753 - ISTAT Lactate: pH 7.52(H), pCO2 31(L), pO2 20(L), Bicarbonate 26, Lactic Acid 2.1  Lactic Acid: 0.8  UA: brown, cloudy. Blood large, urinebilin small, ketone 20, blood large, pH 8.0(H), albumin >499, urobilinogen 4.0, RBC/HPF >182(H), o/w Negative  Hepatic panel: Bilirubin direct 0.3(H), 3.2(L), protein 6.1(L), AST 54(H).    Interventions:  0742 - Toradol 15 mg injection   0742 - Zorfran 4 mg injection  0745 - NS Bolus 1,000mL IV  0800 - Tylenol 1,000 mg PO  0954 - Doxycycline 100 mg  0953 - NS Bolus 1,000mL IV     Emergency Department Course:  Nursing notes and vitals reviewed.  The patient was sent for a XR chest while in the emergency department, results above.   IV was inserted and blood was drawn for laboratory testing, results above.    0711: I performed an exam of the patient as documented above.   0841: Patient rechecked and updated.   1030: Patient rechecked and updated.   1055: Patient rechecked and updated. "     Findings and plan explained to the Patient. Patient discharged home with instructions regarding supportive care, medications, and reasons to return. The importance of close follow-up was reviewed. The patient was prescribed Doxycycline.    I personally reviewed the laboratory and imaging results with the Patient and his family and answered all related questions prior to discharge.    Impression & Plan      Medical Decision Making:  This is a 37-year-old male who presents for evaluation of fever.  He is well hydrated, well appearing, and without specific symptoms, but has had persistent fever and myalgias for 3 days.  He has no prominent headache, neck pain, or stiffness to indicate meningitis, and is overall very well-appearing.  Given myalgias and fever, screening chest x-ray performed and is unremarkable.  Of note, he has had exposure to the woods in the last 2 weeks and has lab findings that may suggest anaplasmosis.  There was no known tick exposure nor has he had rash.  Anaplasmosis testing is performed and is pending.  He does have unexplained hematuria but no symptoms of nephrolithiasis otherwise.  We will empirically treat with doxycycline while we await PCR studies.  I recommended primary care follow-up in 2 3 days for recheck.  His initial relative dehydration is improved with rehydration.  I believe is safe for discharge this time with the above follow-up and strict return precautions for headache, weakness, confusion, or any other concerns.    Diagnosis:    ICD-10-CM    1. Fever, unspecified fever cause R50.9     possible Anaplasmosis   2. Myalgia M79.1        Disposition:  discharged to home    Discharge Medications:  New Prescriptions    DOXYCYCLINE (VIBRAMYCIN) 100 MG CAPSULE    Take 1 capsule (100 mg) by mouth 2 times daily for 10 days     Beckie Silverio  6/20/2018   [facility] EMERGENCY DEPARTMENT  I, Beckie Silverio, am serving as a scribe at 7:11 AM on 6/20/2018 to document  services personally performed by Juan Alberto Carr MD based on my observations and the provider's statements to me.       Juan Alberto Carr MD  06/20/18 1527

## 2018-06-20 NOTE — DISCHARGE INSTRUCTIONS
Tick Facts    Ticks are small arachnids that feed on the blood of rodents, rabbits, birds, deer, dogs, and humans. Ticks do not fly and do not drop from trees. They climb to the tips of plants along trails and attach themselves to you as you brush against the plant. Ticks may also attach to you if you come in contact with an infested animal.  Avoiding ticks  The following tips will help you avoid ticks:    When walking in tick-infested areas, tuck your pants into your boots or socks, and tuck your shirt into your pants.    Wear light-colored clothing so you can easily see any ticks that get on you.    Apply a tick repellent to pants, socks, and shoes.    Avoid brushing against the plants along trails.    Check yourself and your children at the end of each day when hiking through tick-infested areas. Don't forget to check in your hair as well.  Removing ticks  Removing ticks right away will reduce the chance of disease. Here are some tips for removing ticks:    If possible, have someone else remove the tick from you.    Protect your hands from exposure to the tick by using a tissue or rubber glove.    Ticks have hook-like barbs on their mouth, which they use to attach themselves. Use tweezers or small needle-nose pliers instead of your fingers when removing a tick. Grasp the head as close to the skin as possible. Be careful not to squeeze the body, which would inject more fluid from the tick into your skin.    Pull gently and slowly away from skin until the mouthparts let go. If the tick fails to let go, stop pulling. While holding the head with tweezers, slowly turn it 90 degrees. Then, gently pull away from the skin again. This movement may unlock the tick's jaw and make it easier to remove.    Once you have removed the tick, look closely at the bite area. If you think there are still parts of the tick in your skin that you can't remove, contact your doctor.    Wash your hands and the bite site with soap and  water.  Do not:    Crush or squeeze the tick with the tweezers.    Jerk the tick.    Burn or prick the tick.    Try to suffocate the tick with petroleum jelly or nail polish.  Identifying ticks  Most tick bites are harmless. But some ticks carry diseases, such as Lyme disease and Jarek Mountain spotted fever. These can spread to people. Lyme disease is of greatest concern. It is carried by only one type of tick, the deer tick. Many public health departments are able to identify a tick to figure out if it is the type that causes Lyme disease. If this service is available in your area, bring the removed tick in a zip-top bag or jar to the public health department for identification. If you cannot identify the tick and if you were bitten in an area of the country where there is a risk of Lyme disease, contact your doctor.  Date Last Reviewed: 9/1/2016 2000-2017 [employer]. [address] [place] [address]. All rights reserved. This information is not intended as a substitute for professional medical care. Always follow your healthcare professional's instructions.      Discharge Instructions  Fever    You have been seen today for a fever. Fever is a normal body reaction to illness or inflammation. Fever is a sign that your body is doing what it should to fight something off. Fever is not dangerous, but it can make you feel miserable, and you will probably feel better if you get your fever to go down. Most infections are caused by a virus, and antibiotics will not help; your provider will tell you whether antibiotics are needed in your case. At this time your provider does not find that your fever is a sign of anything dangerous or life-threatening.  However, sometimes the signs of serious illness do not show up right away so additional care may be necessary.    Generally, every Emergency Department visit should have a follow-up clinic visit with either a primary or a specialty clinic/provider.  Please follow-up as instructed by your emergency provider today.    What can I do to help myself?    Fill any prescriptions the provider gave you and take them right away--especially antibiotics.    If you have a fever, get plenty of rest and drink lots of fluids, especially water.    What clothes or blankets you have on will not change your fever. Do what is comfortable for you.    Bathing or sponging in lukewarm water may help you feel better.    Tylenol  (acetaminophen), Motrin  (ibuprofen), or Advil  (ibuprofen) help bring fever down and may help you feel more comfortable. Be sure to read and follow the package directions, and ask your provider if you have questions.    Do not drink alcohol.    Return to the Emergency Department if:    Any of the symptoms you have get much worse.    You seem very sick, like being too weak to get up.    You have any new symptoms, especially serious things like abdominal (belly) pain or chest pain.    You are short of breath.    You have a severe headache.    You are vomiting (throwing up) so much you cannot keep fluids or medicines down.    You have confusion or seem unusually drowsy.    You have a seizure.    You have anything else that worries you.  If you were given a prescription for medicine here today, be sure to read all of the information (including the package insert) that comes with your prescription.  This will include important information about the medicine, its side effects, and any warnings that you need to know about.  The pharmacist who fills the prescription can provide more information and answer questions you may have about the medicine.  If you have questions or concerns that the pharmacist cannot address, please call or return to the Emergency Department.   Remember that you can always come back to the Emergency Department if you are not able to see your regular provider in the amount of time listed above, if you get any new symptoms, or if there is anything  that worries you.

## 2018-06-20 NOTE — ED NOTES
Headache and body aches are improving, no nausea now.  Patient walked to the bathroom to void. His urine sample is a dark tea color.

## 2018-06-20 NOTE — ED AVS SNAPSHOT
[facility] Emergency Department    [address]    [facility] [address]    Phone:  [phone]    Fax:  [phone]                                       Ti Peoples   MRN: [id number]    Department:  [facility] Emergency Department   Date of Visit:  6/20/2018           After Visit Summary Signature Page     I have received my discharge instructions, and my questions have been answered. I have discussed any challenges I see with this plan with the nurse or doctor.    ..........................................................................................................................................  Patient/Patient Representative Signature      ..........................................................................................................................................  Patient Representative Print Name and Relationship to Patient    ..................................................               ................................................  Date                                            Time    ..........................................................................................................................................  Reviewed by Signature/Title    ...................................................              ..............................................  Date                                                            Time

## 2018-06-20 NOTE — ED TRIAGE NOTES
ABCs intact. Pt c/o fever starting on Sunday. C/o abdominal pain and nausea. Unknown last BM, but is normal for him to not have one for a week sometimes. Denies urinary symptoms. Denies cough, congestion.

## 2018-06-22 LAB
A PHAGOCYTOPH IGG TITR SER IF: NORMAL {TITER}
A PHAGOCYTOPH IGM TITR SER IF: NORMAL {TITER}

## 2018-06-25 ENCOUNTER — TELEPHONE (OUTPATIENT)
Dept: PEDIATRICS | Facility: CLINIC | Age: 38
End: 2018-06-25

## 2018-06-25 NOTE — TELEPHONE ENCOUNTER
"Patient seen in ER. Patient called for lab results and to see if he should continue the \"blue pills for tick bite\"? (Assuming Doxycycline)  [phone] ok to lm.   "

## 2018-06-25 NOTE — TELEPHONE ENCOUNTER
Notified patient of below and yes he is feeling better. He will complete the course.  Hilda Roach RN

## 2018-06-25 NOTE — TELEPHONE ENCOUNTER
IS patient feeling better? Labs and chart reviwed, if he is tolerating the medication well and feeling better I prefer he continue it to complete the full course as we don't have a definitve diagnosis and a tickborne illness could be a cause even with negative labs.

## 2018-06-26 LAB
BACTERIA SPEC CULT: NO GROWTH
BACTERIA SPEC CULT: NO GROWTH
Lab: NORMAL
Lab: NORMAL
SPECIMEN SOURCE: NORMAL
SPECIMEN SOURCE: NORMAL

## 2021-04-11 ENCOUNTER — HEALTH MAINTENANCE LETTER (OUTPATIENT)
Age: 41
End: 2021-04-11

## 2021-04-27 ENCOUNTER — OFFICE VISIT (OUTPATIENT)
Dept: OPTOMETRY | Facility: CLINIC | Age: 41
End: 2021-04-27
Payer: COMMERCIAL

## 2021-04-27 DIAGNOSIS — Z01.00 EXAMINATION OF EYES AND VISION: ICD-10-CM

## 2021-04-27 DIAGNOSIS — H52.222 REGULAR ASTIGMATISM OF LEFT EYE: Primary | ICD-10-CM

## 2021-04-27 PROCEDURE — 92015 DETERMINE REFRACTIVE STATE: CPT | Performed by: OPTOMETRIST

## 2021-04-27 PROCEDURE — 92004 COMPRE OPH EXAM NEW PT 1/>: CPT | Performed by: OPTOMETRIST

## 2021-04-27 ASSESSMENT — REFRACTION_MANIFEST
OS_CYLINDER: +0.50
OD_SPHERE: PLANO
OS_CYLINDER: +0.50
OS_AXIS: 070
OS_AXIS: 061
OD_CYLINDER: SPHERE
OD_CYLINDER: SPHERE
OS_SPHERE: -1.00
OS_SPHERE: -0.50
METHOD_AUTOREFRACTION: 1
OD_SPHERE: -0.25

## 2021-04-27 ASSESSMENT — SLIT LAMP EXAM - LIDS
COMMENTS: NORMAL
COMMENTS: NORMAL

## 2021-04-27 ASSESSMENT — VISUAL ACUITY
OS_SC+: -1
OS_SC: 20/20
OS_SC: 20/20
OD_SC: 20/20
OD_SC: 20/20-1
METHOD: SNELLEN - LINEAR
OD_SC+: -2

## 2021-04-27 ASSESSMENT — TONOMETRY
IOP_METHOD: APPLANATION
OD_IOP_MMHG: 16
OS_IOP_MMHG: 16

## 2021-04-27 ASSESSMENT — CUP TO DISC RATIO
OD_RATIO: 0.5
OS_RATIO: 0.4

## 2021-04-27 ASSESSMENT — CONF VISUAL FIELD
OD_NORMAL: 1
OS_NORMAL: 1
METHOD: COUNTING FINGERS

## 2021-04-27 ASSESSMENT — EXTERNAL EXAM - RIGHT EYE: OD_EXAM: NORMAL

## 2021-04-27 ASSESSMENT — EXTERNAL EXAM - LEFT EYE: OS_EXAM: NORMAL

## 2021-04-27 NOTE — PROGRESS NOTES
Chief Complaint   Patient presents with     Annual Eye Exam      Stable vision, no concerns at this time.    Last Eye Exam: 2014  Dilated Previously: Yes. Signs and symptoms of dilation were discussed. Patient consents to dilation today.    What are you currently using to see?  does not use glasses or contacts       Distance Vision Acuity: Satisfied with vision    Near Vision Acuity: Satisfied with vision while reading and using computer unaided    Eye Comfort: good  Do you use eye drops? : No      Penny Wolfe CPO          Medical, surgical and family histories reviewed and updated 4/27/2021.       OBJECTIVE: See Ophthalmology exam    ASSESSMENT:    ICD-10-CM    1. Regular astigmatism of left eye  H52.222 REFRACTION     EYE EXAM (SIMPLE-NONBILLABLE)   2. Examination of eyes and vision  Z01.00 EYE EXAM (SIMPLE-NONBILLABLE)    good ocular health   PLAN:   No prescription needed     Shruthi Chavez OD

## 2021-04-27 NOTE — LETTER
4/27/2021         RE: Ti Peoples  [id number] [place] [address]        Dear Colleague,    Thank you for referring your patient[facility]oples[facility]. Please see a copy of my visit note below.    Chief Complaint   Patient presents with     Annual Eye Exam      Stable vision, no concerns at this time.    Last Eye Exam: 2014  Dilated Previously: Yes. Signs and symptoms of dilation were discussed. Patient consents to dilation today.    What are you currently using to see?  does not use glasses or contacts       Distance Vision Acuity: Satisfied with vision    Near Vision Acuity: Satisfied with vision while reading and using computer unaided    Eye Comfort: good  Do you use eye drops? : No      Penny Wolfe CPO          Medical, surgical and family histories reviewed and updated 4/27/2021.       OBJECTIVE: See Ophthalmology exam    ASSESSMENT:    ICD-10-CM    1. Regular astigmatism of left eye  H52.222 REFRACTION     EYE EXAM (SIMPLE-NONBILLABLE)   2. Examination of eyes and vision  Z01.00 EYE EXAM (SIMPLE-NONBILLABLE)    good ocular health   PLAN:   No prescription needed     Shruthi Chavez OD       Again, thank you for allowing me to participate in the care of your patient.        Sincerely,        Shruthi Chavez, OD

## 2021-08-11 ASSESSMENT — ENCOUNTER SYMPTOMS
HEMATURIA: 0
DIARRHEA: 0
PARESTHESIAS: 0
COUGH: 0
PALPITATIONS: 0
JOINT SWELLING: 0
WEAKNESS: 0
EYE PAIN: 0
FREQUENCY: 0
ABDOMINAL PAIN: 0
SORE THROAT: 0
NERVOUS/ANXIOUS: 0
FEVER: 0
CHILLS: 0
HEADACHES: 0
HEMATOCHEZIA: 0
NAUSEA: 0
CONSTIPATION: 0
ARTHRALGIAS: 1
MYALGIAS: 0
HEARTBURN: 0
DIZZINESS: 0
SHORTNESS OF BREATH: 0
DYSURIA: 0

## 2021-08-12 ENCOUNTER — OFFICE VISIT (OUTPATIENT)
Dept: FAMILY MEDICINE | Facility: CLINIC | Age: 41
End: 2021-08-12
Payer: COMMERCIAL

## 2021-08-12 VITALS
OXYGEN SATURATION: 99 % | RESPIRATION RATE: 14 BRPM | TEMPERATURE: 98 F | SYSTOLIC BLOOD PRESSURE: 100 MMHG | BODY MASS INDEX: 24.11 KG/M2 | DIASTOLIC BLOOD PRESSURE: 70 MMHG | HEIGHT: 72 IN | HEART RATE: 68 BPM | WEIGHT: 178 LBS

## 2021-08-12 DIAGNOSIS — M53.3 SI (SACROILIAC) JOINT DYSFUNCTION: ICD-10-CM

## 2021-08-12 DIAGNOSIS — Z13.220 SCREENING FOR HYPERLIPIDEMIA: ICD-10-CM

## 2021-08-12 DIAGNOSIS — M25.552 HIP PAIN, LEFT: ICD-10-CM

## 2021-08-12 DIAGNOSIS — Z00.00 ROUTINE GENERAL MEDICAL EXAMINATION AT A HEALTH CARE FACILITY: Primary | ICD-10-CM

## 2021-08-12 LAB
ANION GAP SERPL CALCULATED.3IONS-SCNC: 4 MMOL/L (ref 3–14)
BUN SERPL-MCNC: 10 MG/DL (ref 7–30)
CALCIUM SERPL-MCNC: 9.1 MG/DL (ref 8.5–10.1)
CHLORIDE BLD-SCNC: 108 MMOL/L (ref 94–109)
CHOLEST SERPL-MCNC: 189 MG/DL
CO2 SERPL-SCNC: 26 MMOL/L (ref 20–32)
CREAT SERPL-MCNC: 1.1 MG/DL (ref 0.66–1.25)
FASTING STATUS PATIENT QL REPORTED: YES
GFR SERPL CREATININE-BSD FRML MDRD: 85 ML/MIN/1.73M2
GLUCOSE BLD-MCNC: 95 MG/DL (ref 70–99)
HDLC SERPL-MCNC: 47 MG/DL
LDLC SERPL CALC-MCNC: 125 MG/DL
NONHDLC SERPL-MCNC: 142 MG/DL
POTASSIUM BLD-SCNC: 4.4 MMOL/L (ref 3.4–5.3)
SODIUM SERPL-SCNC: 138 MMOL/L (ref 133–144)
TRIGL SERPL-MCNC: 85 MG/DL

## 2021-08-12 PROCEDURE — 99385 PREV VISIT NEW AGE 18-39: CPT | Performed by: FAMILY MEDICINE

## 2021-08-12 PROCEDURE — 36415 COLL VENOUS BLD VENIPUNCTURE: CPT | Performed by: FAMILY MEDICINE

## 2021-08-12 PROCEDURE — 99213 OFFICE O/P EST LOW 20 MIN: CPT | Mod: 25 | Performed by: FAMILY MEDICINE

## 2021-08-12 PROCEDURE — 80048 BASIC METABOLIC PNL TOTAL CA: CPT | Performed by: FAMILY MEDICINE

## 2021-08-12 PROCEDURE — 80061 LIPID PANEL: CPT | Performed by: FAMILY MEDICINE

## 2021-08-12 ASSESSMENT — ENCOUNTER SYMPTOMS
CHILLS: 0
HEMATOCHEZIA: 0
WEAKNESS: 0
COUGH: 0
DIZZINESS: 0
HEADACHES: 0
DIARRHEA: 0
FEVER: 0
HEMATURIA: 0
PALPITATIONS: 0
ARTHRALGIAS: 1
FREQUENCY: 0
PARESTHESIAS: 0
HEARTBURN: 0
NERVOUS/ANXIOUS: 0
NAUSEA: 0
EYE PAIN: 0
CONSTIPATION: 0
SORE THROAT: 0
JOINT SWELLING: 0
SHORTNESS OF BREATH: 0
ABDOMINAL PAIN: 0
MYALGIAS: 0
DYSURIA: 0

## 2021-08-12 ASSESSMENT — MIFFLIN-ST. JEOR: SCORE: 1757.46

## 2021-08-12 ASSESSMENT — PAIN SCALES - GENERAL: PAINLEVEL: NO PAIN (0)

## 2021-08-12 NOTE — PROGRESS NOTES
SUBJECTIVE:   CC: Ti Peoples is an 40 year old male who presents for preventative health visit.       Patient has been advised of split billing requirements and indicates understanding: Yes  Healthy Habits:     Getting at least 3 servings of Calcium per day:  NO    Bi-annual eye exam:  Yes    Dental care twice a year:  Yes    Sleep apnea or symptoms of sleep apnea:  None    Diet:  Regular (no restrictions)    Frequency of exercise:  4-5 days/week    Duration of exercise:  15-30 minutes    Taking medications regularly:  Yes    Medication side effects:  None    PHQ-2 Total Score: 0    Additional concerns today:  Yes      Feeling well. Is fasting.    Concerns about L hip.  Feels he may have some arthritis.  Intermittent pain with movement.  No history of injury, surgery.  Indicates that pain is in upper L buttock, will feel sharp and notable when moving/walking.  Can last for several hours and then resolve.  Will happen 1-2 times weekly.  Wonders if weather influences this.    Also has a persistent feeling of pressure over front of L hip.  Has been present for about a month intermittently, constant now for about a week.  No painful, feels like being poked.  No bulging.  No event that he links to this starting.        Today's PHQ-2 Score:   PHQ-2 ( 1999 [employer]) 8/11/2021   Q1: Little interest or pleasure in doing things 0   Q2: Feeling down, depressed or hopeless 0   PHQ-2 Score 0   Q1: Little interest or pleasure in doing things Not at all   Q2: Feeling down, depressed or hopeless Not at all   PHQ-2 Score 0       Abuse: Current or Past(Physical, Sexual or Emotional)- No  Do you feel safe in your environment? Yes    Have you ever done Advance Care Planning? (For example, a Health Directive, POLST, or a discussion with a medical provider or your loved ones about your wishes): No, advance care planning information given to patient to review.  Patient declined advance care planning discussion at this time.    Social  "History     Tobacco Use     Smoking status: Never Smoker     Smokeless tobacco: Never Used     Tobacco comment: second hand smoke   Substance Use Topics     Alcohol use: Yes     Alcohol/week: 1.0 standard drinks     Types: 1 Standard drinks or equivalent per week     Comment: socially     If you drink alcohol do you typically have >3 drinks per day or >7 drinks per week? No    Alcohol Use 8/12/2021   Prescreen: >3 drinks/day or >7 drinks/week? -   Prescreen: >3 drinks/day or >7 drinks/week? No       Last PSA: No results found for: PSA    Reviewed orders with patient. Reviewed health maintenance and updated orders accordingly - Yes  Lab work is in process    Reviewed and updated as needed this visit by clinical staff  Tobacco  Allergies  Meds     Fam Hx          Reviewed and updated as needed this visit by Provider                    Review of Systems   Constitutional: Negative for chills and fever.   HENT: Negative for congestion, ear pain, hearing loss and sore throat.    Eyes: Negative for pain and visual disturbance.   Respiratory: Negative for cough and shortness of breath.    Cardiovascular: Negative for chest pain, palpitations and peripheral edema.   Gastrointestinal: Negative for abdominal pain, constipation, diarrhea, heartburn, hematochezia and nausea.   Genitourinary: Negative for discharge, dysuria, frequency, genital sores, hematuria, impotence and urgency.   Musculoskeletal: Positive for arthralgias. Negative for joint swelling and myalgias.   Skin: Negative for rash.   Neurological: Negative for dizziness, weakness, headaches and paresthesias.   Psychiatric/Behavioral: Negative for mood changes. The patient is not nervous/anxious.          OBJECTIVE:   /70   Pulse 68   Temp 98  F (36.7  C) (Oral)   Resp 14   Ht 1.816 m (5' 11.5\")   Wt 80.7 kg (178 lb)   SpO2 99%   BMI 24.48 kg/m      Physical Exam  Vitals and nursing note reviewed.   Constitutional:       Appearance: He is " well-developed.   HENT:      Head: Normocephalic and atraumatic.      Right Ear: Tympanic membrane, ear canal and external ear normal.      Left Ear: Tympanic membrane, ear canal and external ear normal.   Eyes:      Pupils: Pupils are equal, round, and reactive to light.   Neck:      Thyroid: No thyromegaly.   Cardiovascular:      Rate and Rhythm: Normal rate and regular rhythm.      Heart sounds: Normal heart sounds.   Pulmonary:      Effort: Pulmonary effort is normal.      Breath sounds: Normal breath sounds.   Abdominal:      General: Bowel sounds are normal.      Palpations: Abdomen is soft. There is no mass.      Hernia: There is no hernia in the left inguinal area or left femoral area.   Musculoskeletal:         General: Normal range of motion.      Left hip: No tenderness or crepitus. Normal range of motion. Normal strength.   Lymphadenopathy:      Cervical: No cervical adenopathy.   Skin:     General: Skin is warm and dry.      Findings: No rash.   Neurological:      Mental Status: He is alert and oriented to person, place, and time.   Psychiatric:         Judgment: Judgment normal.               ASSESSMENT/PLAN:   1. Routine general medical examination at a health care facility  Is fasting  - Lipid panel reflex to direct LDL Fasting; Future  - Basic metabolic panel  (Ca, Cl, CO2, Creat, Gluc, K, Na, BUN); Future    2. Screening for hyperlipidemia      3. SI (sacroiliac) joint dysfunction  Normal exam, pain over SI  - [facility] Referral; Future    4. Hip pain, left  Suspect sublte femoral hernia, will refer to surgery for eval  - Adult General Surg Referral    Patient has been advised of split billing requirements and indicates understanding: Yes  COUNSELING:   Reviewed preventive health counseling, as reflected in patient instructions       Regular exercise       Vision screening    Estimated body mass index is 24.48 kg/m  as calculated from the following:    Height as of this encounter: 1.816 m (5'  "11.5\").    Weight as of this encounter: 80.7 kg (178 lb).     Weight management plan: Discussed healthy diet and exercise guidelines    He reports that he has never smoked. He has never used smokeless tobacco.      Counseling Resources:  ATP IV Guidelines  Pooled Cohorts Equation Calculator  FRAX Risk Assessment  ICSI Preventive Guidelines  Dietary Guidelines for Americans, 2010  USDA's MyPlate  ASA Prophylaxis  Lung CA Screening    Harshil Cadet MD  [facility]  "

## 2021-09-10 ENCOUNTER — OFFICE VISIT (OUTPATIENT)
Dept: SURGERY | Facility: CLINIC | Age: 41
End: 2021-09-10
Payer: COMMERCIAL

## 2021-09-10 VITALS
HEART RATE: 69 BPM | RESPIRATION RATE: 16 BRPM | HEIGHT: 72 IN | OXYGEN SATURATION: 99 % | SYSTOLIC BLOOD PRESSURE: 100 MMHG | DIASTOLIC BLOOD PRESSURE: 68 MMHG | WEIGHT: 178 LBS | BODY MASS INDEX: 24.11 KG/M2

## 2021-09-10 DIAGNOSIS — R10.32 LEFT GROIN PAIN: Primary | ICD-10-CM

## 2021-09-10 PROCEDURE — 99202 OFFICE O/P NEW SF 15 MIN: CPT | Performed by: SURGERY

## 2021-09-10 ASSESSMENT — MIFFLIN-ST. JEOR: SCORE: 1752.46

## 2021-09-10 NOTE — LETTER
September 10, 2021    RE: Ti Peoples, : 1980      Ti is a 41 year old male who presents for evaluation for inguinal hernia.  Symptoms began 1 year ago.  This is described as a mild discomfort and fullness in the left upper thigh area. The patient has not noticed a bulge, no reducible lump has been present. Pt has not had previous abdominal surgery. 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     Constipation No   Dysuria No  Cough No     Pt's chart has been reviewed for PMH, PSH, allergies, medications, social history and family history.     ROS:  Pulm:  No shortness of breath, dyspnea on exertion, cough, or hemoptysis  CV:  negative  ABD:  See chief complaint  :  Negative  All other systems negative/normal     There were no vitals taken for this visit.  Physical exam:  Patient able to get up on table without difficulty.  Abdomen is abdomen is soft without significant tenderness, masses, organomegaly or guarding  bowel sounds are positive and no caput medusa noted.  Hernia  Left inguinal hernia is not present with valsalva; the site of reported symptoms is actually high on the anterior thigh several cm inferior to the inguinal canal.              Right inguinal hernia is not present with valsalva              Testicles are normal     Assesment: no inguinal hernia, possibly musculoskeletal strain     Plan: The nature of hernias, anatomic considerations, indications and approaches to repair were discussed.  I relayed that I did not detect a hernia on exam and that the location of his discomfort would not be a common place for hernia symptoms to manifest. He will return to his PCP if symptoms worsen.        Raymon Colvin MD

## 2021-09-10 NOTE — PROGRESS NOTES
Ti is a 41 year old male who presents for evaluation for inguinal hernia.  Symptoms began 1 year ago.  This is described as a mild discomfort and fullness in the left upper thigh area. The patient has not noticed a bulge, no reducible lump has been present. Pt has not had previous abdominal surgery. 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    Constipation No   Dysuria No  Cough No    Pt's chart has been reviewed for PMH, PSH, allergies, medications, social history and family history.    ROS:  Pulm:  No shortness of breath, dyspnea on exertion, cough, or hemoptysis  CV:  negative  ABD:  See chief complaint  :  Negative  All other systems negative/normal    There were no vitals taken for this visit.  Physical exam:  Patient able to get up on table without difficulty.  Abdomen is abdomen is soft without significant tenderness, masses, organomegaly or guarding  bowel sounds are positive and no caput medusa noted.  Hernia  Left inguinal hernia is not present with valsalva; the site of reported symptoms is actually high on the anterior thigh several cm inferior to the inguinal canal.              Right inguinal hernia is not present with valsalva              Testicles are normal    Assesment: no inguinal hernia, possibly musculoskeletal strain    Plan: The nature of hernias, anatomic considerations, indications and approaches to repair were discussed.  I relayed that I did not detect a hernia on exam and that the location of his discomfort would not be a common place for hernia symptoms to manifest. He will return to his PCP if symptoms worsen.      Raymon Colvin MD  9/10/2021 1:54 PM    Please route or send letter to:  Primary Care Provider (PCP)

## 2021-09-25 ENCOUNTER — HEALTH MAINTENANCE LETTER (OUTPATIENT)
Age: 41
End: 2021-09-25

## 2023-01-07 ENCOUNTER — HEALTH MAINTENANCE LETTER (OUTPATIENT)
Age: 43
End: 2023-01-07

## 2023-01-12 ENCOUNTER — OFFICE VISIT (OUTPATIENT)
Dept: FAMILY MEDICINE | Facility: CLINIC | Age: 43
End: 2023-01-12
Payer: COMMERCIAL

## 2023-01-12 VITALS
TEMPERATURE: 97.6 F | OXYGEN SATURATION: 97 % | HEART RATE: 72 BPM | DIASTOLIC BLOOD PRESSURE: 73 MMHG | SYSTOLIC BLOOD PRESSURE: 127 MMHG | HEIGHT: 72 IN | WEIGHT: 184.5 LBS | BODY MASS INDEX: 24.99 KG/M2 | RESPIRATION RATE: 15 BRPM

## 2023-01-12 DIAGNOSIS — Z11.59 NEED FOR HEPATITIS C SCREENING TEST: ICD-10-CM

## 2023-01-12 DIAGNOSIS — Z00.00 ROUTINE GENERAL MEDICAL EXAMINATION AT A HEALTH CARE FACILITY: Primary | ICD-10-CM

## 2023-01-12 LAB
ANION GAP SERPL CALCULATED.3IONS-SCNC: 12 MMOL/L (ref 7–15)
BUN SERPL-MCNC: 12.4 MG/DL (ref 6–20)
CALCIUM SERPL-MCNC: 9.4 MG/DL (ref 8.6–10)
CHLORIDE SERPL-SCNC: 105 MMOL/L (ref 98–107)
CREAT SERPL-MCNC: 1 MG/DL (ref 0.67–1.17)
DEPRECATED HCO3 PLAS-SCNC: 22 MMOL/L (ref 22–29)
GFR SERPL CREATININE-BSD FRML MDRD: >90 ML/MIN/1.73M2
GLUCOSE SERPL-MCNC: 94 MG/DL (ref 70–99)
POTASSIUM SERPL-SCNC: 4.4 MMOL/L (ref 3.4–5.3)
SODIUM SERPL-SCNC: 139 MMOL/L (ref 136–145)

## 2023-01-12 PROCEDURE — 90471 IMMUNIZATION ADMIN: CPT | Performed by: FAMILY MEDICINE

## 2023-01-12 PROCEDURE — 80048 BASIC METABOLIC PNL TOTAL CA: CPT | Performed by: FAMILY MEDICINE

## 2023-01-12 PROCEDURE — 99396 PREV VISIT EST AGE 40-64: CPT | Mod: 25 | Performed by: FAMILY MEDICINE

## 2023-01-12 PROCEDURE — 36415 COLL VENOUS BLD VENIPUNCTURE: CPT | Performed by: FAMILY MEDICINE

## 2023-01-12 PROCEDURE — 86803 HEPATITIS C AB TEST: CPT | Performed by: FAMILY MEDICINE

## 2023-01-12 PROCEDURE — 90715 TDAP VACCINE 7 YRS/> IM: CPT | Performed by: FAMILY MEDICINE

## 2023-01-12 ASSESSMENT — ENCOUNTER SYMPTOMS
ARTHRALGIAS: 0
HEARTBURN: 0
DIARRHEA: 0
NERVOUS/ANXIOUS: 0
HEMATOCHEZIA: 0
ABDOMINAL PAIN: 0
WEAKNESS: 0
DYSURIA: 0
FEVER: 0
CHILLS: 0
MYALGIAS: 0
PALPITATIONS: 0
EYE PAIN: 0
DIZZINESS: 0
SORE THROAT: 0
COUGH: 0
HEMATURIA: 0
CONSTIPATION: 0
JOINT SWELLING: 0
SHORTNESS OF BREATH: 0
HEADACHES: 0
FREQUENCY: 0

## 2023-01-12 ASSESSMENT — PAIN SCALES - GENERAL: PAINLEVEL: NO PAIN (0)

## 2023-01-12 NOTE — PROGRESS NOTES
"SUBJECTIVE:   CC: Benji is an 42 year old who presents for preventative health visit.   Patient has been advised of split billing requirements and indicates understanding: Yes       Healthy Habits:     Getting at least 3 servings of Calcium per day:  Yes    Bi-annual eye exam:  NO    Dental care twice a year:  Yes    Sleep apnea or symptoms of sleep apnea:  None    Diet:  Regular (no restrictions)    Frequency of exercise:  1 day/week    Duration of exercise:  15-30 minutes    Taking medications regularly:  Yes    Medication side effects:  None    PHQ-2 Total Score: 1    Additional concerns today:  No    Concerns  A \"weak spot\" with LT side of groin area. It was entioned last visit. He was sent to a doc b/c it was a suspected hernia but the doc said there was nothing wrong.  Spot has gotten better since.        Today's PHQ-2 Score:   PHQ-2 ( 1999 Pfizer) 1/12/2023   Q1: Little interest or pleasure in doing things 1   Q2: Feeling down, depressed or hopeless 0   PHQ-2 Score 1   PHQ-2 Total Score (12-17 Years)- Positive if 3 or more points; Administer PHQ-A if positive -   Q1: Little interest or pleasure in doing things Several days   Q2: Feeling down, depressed or hopeless Not at all   PHQ-2 Score 1           Social History     Tobacco Use     Smoking status: Never     Smokeless tobacco: Never     Tobacco comments:     second hand smoke   Substance Use Topics     Alcohol use: Yes     Alcohol/week: 1.0 standard drink     Types: 1 Standard drinks or equivalent per week     Comment: socially         Alcohol Use 1/12/2023   Prescreen: >3 drinks/day or >7 drinks/week? No   Prescreen: >3 drinks/day or >7 drinks/week? -       Last PSA: No results found for: PSA    Reviewed orders with patient. Reviewed health maintenance and updated orders accordingly - Yes  Lab work is in process  Labs reviewed in EPIC    Reviewed and updated as needed this visit by clinical staff   Tobacco  Allergies  Meds     Fam Hx  Soc Hx    " "    Reviewed and updated as needed this visit by Provider                     Review of Systems   Constitutional: Negative for chills and fever.   HENT: Negative for congestion, ear pain and sore throat.    Eyes: Negative for pain and visual disturbance.   Respiratory: Negative for cough and shortness of breath.    Cardiovascular: Negative for chest pain, palpitations and peripheral edema.   Gastrointestinal: Negative for abdominal pain, constipation, diarrhea, heartburn and hematochezia.   Genitourinary: Negative for dysuria, frequency, genital sores, hematuria, impotence, penile discharge and urgency.   Musculoskeletal: Negative for arthralgias, joint swelling and myalgias.   Skin: Negative for rash.   Neurological: Negative for dizziness, weakness and headaches.   Psychiatric/Behavioral: Negative for mood changes. The patient is not nervous/anxious.        OBJECTIVE:   /73 (BP Location: Right arm, Patient Position: Sitting, Cuff Size: Adult Large)   Pulse 72   Temp 97.6  F (36.4  C) (Oral)   Resp 15   Ht 1.816 m (5' 11.5\")   Wt 83.7 kg (184 lb 8 oz)   SpO2 97%   BMI 25.37 kg/m      Physical Exam  Vitals and nursing note reviewed.   Constitutional:       Appearance: He is well-developed.   HENT:      Head: Normocephalic and atraumatic.      Right Ear: Tympanic membrane, ear canal and external ear normal.      Left Ear: Tympanic membrane, ear canal and external ear normal.   Eyes:      Pupils: Pupils are equal, round, and reactive to light.   Neck:      Thyroid: No thyromegaly.   Cardiovascular:      Rate and Rhythm: Normal rate and regular rhythm.      Heart sounds: Normal heart sounds.   Pulmonary:      Effort: Pulmonary effort is normal.      Breath sounds: Normal breath sounds.   Abdominal:      General: Bowel sounds are normal.      Palpations: Abdomen is soft. There is no mass.   Musculoskeletal:         General: Normal range of motion.   Lymphadenopathy:      Cervical: No cervical adenopathy. "   Skin:     General: Skin is warm and dry.      Findings: No rash.   Neurological:      Mental Status: He is alert and oriented to person, place, and time.   Psychiatric:         Judgment: Judgment normal.           Diagnostic Test Results:  Labs reviewed in Epic    ASSESSMENT/PLAN:       ICD-10-CM    1. Routine general medical examination at a health care facility  Z00.00 TDAP VACCINE (Adacel, Boostrix)     REVIEW OF HEALTH MAINTENANCE PROTOCOL ORDERS     Basic metabolic panel  (Ca, Cl, CO2, Creat, Gluc, K, Na, BUN)      2. Need for hepatitis C screening test  Z11.59 Hepatitis C Screen Reflex to HCV RNA Quant and Genotype                COUNSELING:   Reviewed preventive health counseling, as reflected in patient instructions        He reports that he has never smoked. He has never used smokeless tobacco.        Harshil Cadet MD  [facility]

## 2023-01-13 LAB — HCV AB SERPL QL IA: NONREACTIVE

## 2023-05-25 ENCOUNTER — OFFICE VISIT (OUTPATIENT)
Dept: URGENT CARE | Facility: URGENT CARE | Age: 43
End: 2023-05-25
Payer: COMMERCIAL

## 2023-05-25 VITALS — OXYGEN SATURATION: 99 % | RESPIRATION RATE: 16 BRPM | TEMPERATURE: 97.5 F | HEART RATE: 66 BPM

## 2023-05-25 DIAGNOSIS — J02.9 PHARYNGITIS, UNSPECIFIED ETIOLOGY: Primary | ICD-10-CM

## 2023-05-25 LAB — DEPRECATED S PYO AG THROAT QL EIA: NEGATIVE

## 2023-05-25 PROCEDURE — 87651 STREP A DNA AMP PROBE: CPT | Performed by: PHYSICIAN ASSISTANT

## 2023-05-25 PROCEDURE — 99213 OFFICE O/P EST LOW 20 MIN: CPT | Performed by: PHYSICIAN ASSISTANT

## 2023-05-25 NOTE — PROGRESS NOTES
Assessment & Plan     Pharyngitis, unspecified etiology  Rapid strep negative in clinic, likely viral etiology.  Possible COVID, offered testing for patient but declined and said he will take a test at home.  Patient well-appearing, vitals are normal.  Advised conservative management at home, pending PCR result at this time.  Monitor for improvement in symptoms, return for any significant worsening of symptoms which we discussed.  - Streptococcus A Rapid Screen w/Reflex to PCR - Clinic Collect  - Group A Streptococcus PCR Throat Swab     I spent a total of 20 minutes on the day of the visit.   Time spent by me doing chart review, history and exam, documentation and further activities per the note    Return in about 2 weeks (around 6/8/2023) for reevaluation with PCP if symptoms not improving, return earlier if symptoms are worsening.    Miguel Davila PA-C  [facility]anc is a 42 year old male who presents to clinic today for the following health issues:  Chief Complaint   Patient presents with     Pharyngitis     ST, body aches X 1 day.      HPI  Onset of symptoms was 1 day(s) ago.  Course of illness is same.    Severity mild  Current and Associated symptoms: ear plugging bilaterally, sore throat and fatigue  Denies fever, chills, sweats, runny nose, stuffy nose, cough - non-productive, shortness of breath, ear pain, hoarse voice, eye drainage, facial pain/pressure, headache, body aches, nausea, vomiting, diarrhea, not eating and not sleeping well  Treatment measures tried include None tried  Predisposing factors include ill contact: friends  Recent antibiotics? No    Review of Systems  Focused ROS obtained, pertinent positives/negatives reviewed in the HPI.       Objective    Pulse 66   Temp 97.5  F (36.4  C) (Tympanic)   Resp 16   SpO2 99%   Physical Exam   GENERAL: healthy, alert and no distress  EYES: Eyes grossly normal to inspection  HENT: normal  cephalic/atraumatic, ear canals and TM's normal, nose and mouth without ulcers or lesions, oropharynx clear, oral mucous membranes moist, tonsillar hypertrophy, tonsillar erythema and sinuses: not tender  NECK: bilateral anterior cervical adenopathy  SKIN: no suspicious lesions or rashes    Results for orders placed or performed in visit on 05/25/23 (from the past 24 hour(s))   Streptococcus A Rapid Screen w/Reflex to PCR - Clinic Collect    Specimen: Throat; Swab   Result Value Ref Range    Group A Strep antigen Negative Negative

## 2023-05-26 LAB — GROUP A STREP BY PCR: NOT DETECTED

## 2023-10-19 ENCOUNTER — OFFICE VISIT (OUTPATIENT)
Dept: FAMILY MEDICINE | Facility: CLINIC | Age: 43
End: 2023-10-19
Payer: COMMERCIAL

## 2023-10-19 VITALS
HEART RATE: 67 BPM | DIASTOLIC BLOOD PRESSURE: 83 MMHG | RESPIRATION RATE: 16 BRPM | SYSTOLIC BLOOD PRESSURE: 122 MMHG | TEMPERATURE: 97.5 F | OXYGEN SATURATION: 99 % | HEIGHT: 72 IN | WEIGHT: 180 LBS | BODY MASS INDEX: 24.38 KG/M2

## 2023-10-19 DIAGNOSIS — K64.5 THROMBOSED EXTERNAL HEMORRHOIDS: Primary | ICD-10-CM

## 2023-10-19 DIAGNOSIS — K59.04 CHRONIC IDIOPATHIC CONSTIPATION: ICD-10-CM

## 2023-10-19 PROCEDURE — 99214 OFFICE O/P EST MOD 30 MIN: CPT | Performed by: FAMILY MEDICINE

## 2023-10-19 ASSESSMENT — ENCOUNTER SYMPTOMS
ANAL BLEEDING: 0
HEMATOCHEZIA: 0
RECTAL PAIN: 1
FEVER: 0
DIARRHEA: 0
CONSTIPATION: 1

## 2023-10-19 ASSESSMENT — PAIN SCALES - GENERAL: PAINLEVEL: NO PAIN (0)

## 2023-10-19 NOTE — COMMUNITY RESOURCES LIST (ENGLISH)
10/19/2023   [facility]  N/A  For additional resource needs, please contact your health insurance member services or your primary care team.  Phone: [phone]   Email: N/A   Address: [address] [place] [address]   Hours: N/A        Financial Stability       Rent and mortgage payment assistance  1  [address] [place] [address] - Rent payment assistance Distance: 6.15 miles      In-Person, Phone/Virtual   [address] [place] [address]  Language: English  Hours: Mon 8:00 AM - 4:00 PM , Tue 8:00 AM - 7:00 PM , Wed - Thu 8:00 AM - 4:00 PM  Fees: Free   Phone: (999) 815-4971 Email: info@[address] [place] [address].[facility] Website: https://[address] [place] [address].Amplience/resources/resource-centers/     2  Community Action Partnership ([facility]) [facility] Family Homeless Prevention Assistance Program (FHPAP) Distance: 6.5 miles      In-Person   [address] [place] [address]  Language: English, Greek  Hours: Mon - Fri 8:00 AM - 4:30 PM  Fees: Free   Phone: (216) 680-9659 Email: info@Razoom.org Website: http://www.capNexgate.org          Important Numbers & Websites       [facility]   211 Pending sale to [facility] Health[facility]way.org  Poison Control   (881) 200-7880 [place]  Suicide and Crisis Lifeline   [address] [place], [place] [address]line.org  Childhelp [place] Child Abuse Hotline   219.188.7854 Childhelphotline.org  National Sexual Assault Hotline   (613) 957-4933 (HOPE) Rainn.org  National Runaway Safeline   (118) 251-2635 (RUNAWAY) [facility].org  Pregnancy & Postpartum Support [place]   Call/text 093-770-3841 Ppsupportmn.org  Substance Abuse National Helpline ([facility]   081-121-HELP (7794) Findtreatment.gov  Emergency Services   911

## 2023-10-19 NOTE — COMMUNITY RESOURCES LIST (ENGLISH)
10/19/2023   [facility]  N/A  For questions about this resource list or additional care needs, please contact your primary care clinic or care manager.  Phone: [phone]   Email: N/A   Address: [address] [place] [address]   Hours: N/A        Financial Stability       Rent and mortgage payment assistance  1  [address] [place] [address] - Rent payment assistance Distance: 6.15 miles      In-Person, Phone/Virtual   [address] [place] [address]  Language: English  Hours: Mon 8:00 AM - 4:00 PM , Tue 8:00 AM - 7:00 PM , Wed - Thu 8:00 AM - 4:00 PM  Fees: Free   Phone: (438) 289-2193 Email: info@[facility] Website: https://[address] [place] [address].TokBox/resources/resource-centers/     2  Community Action Partnership ([facility]) [facility]  [facility] Homeless Prevention Assistance Program (FHPAP) Distance: 6.5 miles      In-Person   [address] [place] [address]  Language: English, Czech  Hours: Mon - Fri 8:00 AM - 4:30 PM  Fees: Free   Phone: (377) 344-5962 Email: info@beModel.TokBox Website: http://www.capagenUlmon.org          Important Numbers & Websites       Emergency Services   911  [facility]   311  Poison Control   (436) 694-1435  Suicide Prevention Lifeline   (548) 302-9285 (TALK)  Child Abuse Hotline   (498) 934-9033 (4-A-Child)  Sexual Assault Hotline   (314) 843-1795 (HOPE)  National Runaway Safeline   (726) 651-1464 (RUNAWAY)  All-Options Talkline   (850) 854-9949  Substance Abuse Referral   (332) 899-3278 (HELP)

## 2023-10-19 NOTE — PROGRESS NOTES
"  Assessment and Plan    (K64.5) Thrombosed external hemorrhoids  (primary encounter diagnosis)  Comment: small, resolving thrombosis  Plan: topical OTC rectal steroid PRN    (K59.04) Chronic idiopathic constipation  Comment: discussed role can play in above  Plan: psyllium fiber up to TID PRN      RTC in 6m CPE    Harshil Cadet MD      Brina Leblanc is a 43 year old, presenting for the following health issues:  Rectal Problem        10/19/2023     6:49 AM   Additional Questions   Roomed by Suleman haynes cma   Accompanied by self         10/19/2023     6:49 AM   Patient Reported Additional Medications   Patient reports taking the following new medications na       History of Present Illness       Reason for visit:  Sist on on but hole  Symptom onset:  1-2 weeks ago  Symptoms include:  Sist on butt  Symptom intensity:  Mild  Symptom progression:  Staying the same  Had these symptoms before:  No  What makes it worse:  Agitating it  What makes it better:  Butt cream for smoothness    He eats 0-1 servings of fruits and vegetables daily.He consumes 2 sweetened beverage(s) daily.He exercises with enough effort to increase his heart rate 9 or less minutes per day.  He exercises with enough effort to increase his heart rate 3 or less days per week.   He is taking medications regularly.       Present for about 2 weeks, tender but not painful.  A bit itchy.  No change in bowel habits, but is always a bit on the constipated side, often only 1 BM weekly.  No rectal bleeding.  No fever.  No previous similar episodes.      Review of Systems   Constitutional:  Negative for fever.   Gastrointestinal:  Positive for constipation and rectal pain. Negative for anal bleeding, diarrhea and hematochezia.   Genitourinary: Negative.             Objective    /83   Pulse 67   Temp 97.5  F (36.4  C) (Oral)   Resp 16   Ht 1.816 m (5' 11.5\")   Wt 81.6 kg (180 lb)   SpO2 99%   BMI 24.76 kg/m    Body mass index is 24.76 " kg/m .  Physical Exam  Vitals and nursing note reviewed.   Constitutional:       Appearance: Normal appearance.   Genitourinary:     Rectum: External hemorrhoid present.      Comments: Small external hemorrhoid  Skin:     General: Skin is warm and dry.   Neurological:      General: No focal deficit present.      Mental Status: He is alert and oriented to person, place, and time.   Psychiatric:         Mood and Affect: Mood normal.         Behavior: Behavior normal.

## 2024-01-15 ENCOUNTER — OFFICE VISIT (OUTPATIENT)
Dept: FAMILY MEDICINE | Facility: CLINIC | Age: 44
End: 2024-01-15
Payer: COMMERCIAL

## 2024-01-15 VITALS
SYSTOLIC BLOOD PRESSURE: 116 MMHG | OXYGEN SATURATION: 99 % | BODY MASS INDEX: 23.7 KG/M2 | WEIGHT: 175 LBS | DIASTOLIC BLOOD PRESSURE: 82 MMHG | HEART RATE: 69 BPM | TEMPERATURE: 98.4 F | HEIGHT: 72 IN | RESPIRATION RATE: 15 BRPM

## 2024-01-15 DIAGNOSIS — Z00.00 ROUTINE GENERAL MEDICAL EXAMINATION AT A HEALTH CARE FACILITY: Primary | ICD-10-CM

## 2024-01-15 LAB
ANION GAP SERPL CALCULATED.3IONS-SCNC: 8 MMOL/L (ref 7–15)
BUN SERPL-MCNC: 11.3 MG/DL (ref 6–20)
CALCIUM SERPL-MCNC: 9.5 MG/DL (ref 8.6–10)
CHLORIDE SERPL-SCNC: 106 MMOL/L (ref 98–107)
CHOLEST SERPL-MCNC: 156 MG/DL
CREAT SERPL-MCNC: 1.03 MG/DL (ref 0.67–1.17)
DEPRECATED HCO3 PLAS-SCNC: 26 MMOL/L (ref 22–29)
EGFRCR SERPLBLD CKD-EPI 2021: >90 ML/MIN/1.73M2
FASTING STATUS PATIENT QL REPORTED: YES
GLUCOSE SERPL-MCNC: 93 MG/DL (ref 70–99)
HDLC SERPL-MCNC: 44 MG/DL
LDLC SERPL CALC-MCNC: 99 MG/DL
NONHDLC SERPL-MCNC: 112 MG/DL
POTASSIUM SERPL-SCNC: 4.4 MMOL/L (ref 3.4–5.3)
SODIUM SERPL-SCNC: 140 MMOL/L (ref 135–145)
TRIGL SERPL-MCNC: 63 MG/DL

## 2024-01-15 PROCEDURE — 90746 HEPB VACCINE 3 DOSE ADULT IM: CPT | Performed by: FAMILY MEDICINE

## 2024-01-15 PROCEDURE — 80048 BASIC METABOLIC PNL TOTAL CA: CPT | Performed by: FAMILY MEDICINE

## 2024-01-15 PROCEDURE — 90471 IMMUNIZATION ADMIN: CPT | Performed by: FAMILY MEDICINE

## 2024-01-15 PROCEDURE — 36415 COLL VENOUS BLD VENIPUNCTURE: CPT | Performed by: FAMILY MEDICINE

## 2024-01-15 PROCEDURE — 99396 PREV VISIT EST AGE 40-64: CPT | Mod: 25 | Performed by: FAMILY MEDICINE

## 2024-01-15 PROCEDURE — 80061 LIPID PANEL: CPT | Performed by: FAMILY MEDICINE

## 2024-01-15 ASSESSMENT — ENCOUNTER SYMPTOMS
HEADACHES: 0
JOINT SWELLING: 0
NERVOUS/ANXIOUS: 0
DIARRHEA: 0
WEAKNESS: 0
HEMATURIA: 0
CHILLS: 0
SHORTNESS OF BREATH: 0
FEVER: 0
NAUSEA: 0
PARESTHESIAS: 0
CONSTIPATION: 0
MYALGIAS: 0
ARTHRALGIAS: 0
DIZZINESS: 0
HEMATOCHEZIA: 0
SORE THROAT: 0
HEARTBURN: 0
EYE PAIN: 0
DYSURIA: 0
ABDOMINAL PAIN: 0
PALPITATIONS: 0
FREQUENCY: 0

## 2024-01-15 ASSESSMENT — PAIN SCALES - GENERAL: PAINLEVEL: NO PAIN (0)

## 2024-01-15 NOTE — PROGRESS NOTES
"SUBJECTIVE:   Benji is a 43 year old, presenting for the following:  Physical        1/15/2024     8:10 AM   Additional Questions   Roomed by Anamika Caban VF     Would like to discuss a cough he has had for about 2 weeks.     Fasting for annual labs.    Would like Hep B and IPV vaccines. No covid or flu.         Healthy Habits:     Getting at least 3 servings of Calcium per day:  Yes    Bi-annual eye exam:  NO    Dental care twice a year:  Yes    Sleep apnea or symptoms of sleep apnea:  None    Diet:  Regular (no restrictions)    Frequency of exercise:  4-5 days/week    Duration of exercise:  30-45 minutes    Taking medications regularly:  Yes    Medication side effects:  None    Additional concerns today:  No      Today's PHQ-2 Score:       1/15/2024     8:05 AM   PHQ-2 ( 1999 Pfizer)   Q1: Little interest or pleasure in doing things 0   Q2: Feeling down, depressed or hopeless 0   PHQ-2 Score 0   Q1: Little interest or pleasure in doing things Not at all   Q2: Feeling down, depressed or hopeless Not at all   PHQ-2 Score 0       Social History     Tobacco Use    Smoking status: Never    Smokeless tobacco: Never    Tobacco comments:     second hand smoke   Substance Use Topics    Alcohol use: Yes     Alcohol/week: 1.0 standard drink of alcohol     Types: 1 Standard drinks or equivalent per week     Comment: socially             1/15/2024     8:05 AM   Alcohol Use   Prescreen: >3 drinks/day or >7 drinks/week? No          No data to display                Last PSA: No results found for: \"PSA\"    Reviewed orders with patient. Reviewed health maintenance and updated orders accordingly - Yes  Lab work is in process  Labs reviewed in EPIC    Reviewed and updated as needed this visit by clinical staff   Tobacco  Allergies  Meds              Reviewed and updated as needed this visit by Provider                     Review of Systems   Constitutional:  Negative for chills and fever.   HENT:  Negative for congestion, ear pain, " "hearing loss and sore throat.    Eyes:  Negative for pain and visual disturbance.   Respiratory:  Negative for shortness of breath.    Cardiovascular:  Negative for chest pain, palpitations and peripheral edema.   Gastrointestinal:  Negative for abdominal pain, constipation, diarrhea, heartburn, hematochezia and nausea.   Genitourinary:  Negative for dysuria, frequency, genital sores, hematuria and urgency.   Musculoskeletal:  Negative for arthralgias, joint swelling and myalgias.   Skin:  Negative for rash.   Neurological:  Negative for dizziness, weakness, headaches and paresthesias.   Psychiatric/Behavioral:  Negative for mood changes. The patient is not nervous/anxious.        OBJECTIVE:   /82 (BP Location: Right arm, Patient Position: Sitting, Cuff Size: Adult Large)   Pulse 69   Temp 98.4  F (36.9  C) (Oral)   Resp 15   Ht 1.816 m (5' 11.5\")   Wt 79.4 kg (175 lb)   SpO2 99%   BMI 24.07 kg/m      Physical Exam  Vitals and nursing note reviewed.   Constitutional:       Appearance: He is well-developed.   HENT:      Head: Normocephalic and atraumatic.      Right Ear: Tympanic membrane, ear canal and external ear normal.      Left Ear: Tympanic membrane, ear canal and external ear normal.   Eyes:      Pupils: Pupils are equal, round, and reactive to light.   Neck:      Thyroid: No thyromegaly.   Cardiovascular:      Rate and Rhythm: Normal rate and regular rhythm.      Heart sounds: Normal heart sounds.   Pulmonary:      Effort: Pulmonary effort is normal.      Breath sounds: Normal breath sounds.   Abdominal:      General: Bowel sounds are normal.      Palpations: Abdomen is soft. There is no mass.   Musculoskeletal:         General: Normal range of motion.   Lymphadenopathy:      Cervical: No cervical adenopathy.   Skin:     General: Skin is warm and dry.      Findings: No rash.   Neurological:      Mental Status: He is alert and oriented to person, place, and time.   Psychiatric:         Judgment: " Judgment normal.         ASSESSMENT/PLAN:       ICD-10-CM    1. Routine general medical examination at a health care facility  Z00.00 Lipid panel reflex to direct LDL Fasting     Basic metabolic panel  (Ca, Cl, CO2, Creat, Gluc, K, Na, BUN)     Lipid panel reflex to direct LDL Fasting     Basic metabolic panel  (Ca, Cl, CO2, Creat, Gluc, K, Na, BUN)                COUNSELING:   Reviewed preventive health counseling, as reflected in patient instructions        He reports that he has never smoked. He has never used smokeless tobacco.            Harshil Cadet MD  [facility]

## 2024-01-15 NOTE — COMMUNITY RESOURCES LIST (ENGLISH)
01/15/2024   [facility]  N/A  For questions about this resource list or additional care needs, please contact your primary care clinic or care manager.  Phone: [phone]   Email: N/A   Address: [address] [place] [address]   Hours: N/A        Financial Stability       Rent and mortgage payment assistance  1  [address] [place] [address] Distance: 6.15 miles      In-Person, Phone/Virtual   [address] [place] [address]  Language: English  Hours: Mon 8:00 AM - 4:00 PM , Tue 8:00 AM - 7:00 PM , Wed - Thu 8:00 AM - 4:00 PM  Fees: Free   Phone: (479) 942-7802 Email: info@[facility] Hospital[facility] timeads[facility] Website: https://[address] [place] [address].StrongLoop/resources/resource-centers/     2  Community Action Partnership ([facility]) [facility] Homeless Prevention Assistance Program (FHPA) Distance: 6.5 miles      In-Person   [address] [place] [address]  Language: English, Divehi  Hours: Mon - Fri 8:00 AM - 4:30 PM  Fees: Free   Phone: (865) 424-7959 Email: info@Isentio.StrongLoop Website: http://www.Isentio.org          Important Numbers & Websites       Emergency Services   911  City Services   311  Poison Control   (670) 476-2068  Suicide Prevention Lifeline   (825) 400-5748 (TALK)  Child Abuse Hotline   (896) 818-6700 (4-A-Child)  Sexual Assault Hotline   (946) 766-9345 (HOPE)  National Runaway Safeline   (424) 586-8681 (RUNAWAY)  All-Options Talkline   (994) 771-7188  Substance Abuse Referral   (567) 922-7516 (HELP)

## 2024-01-15 NOTE — PROGRESS NOTES
Prior to immunization administration, verified patients identity using patient s name and date of birth. Please see Immunization Activity for additional information.     Screening Questionnaire for Adult Immunization    Are you sick today?   No   Do you have allergies to medications, food, a vaccine component or latex?   No   Have you ever had a serious reaction after receiving a vaccination?   No   Do you have a long-term health problem with heart, lung, kidney, or metabolic disease (e.g., diabetes), asthma, a blood disorder, no spleen, complement component deficiency, a cochlear implant, or a spinal fluid leak?  Are you on long-term aspirin therapy?   No   Do you have cancer, leukemia, HIV/AIDS, or any other immune system problem?   No   Do you have a parent, brother, or sister with an immune system problem?   Don't Know   In the past 3 months, have you taken medications that affect  your immune system, such as prednisone, other steroids, or anticancer drugs; drugs for the treatment of rheumatoid arthritis, Crohn s disease, or psoriasis; or have you had radiation treatments?   No   Have you had a seizure, or a brain or other nervous system problem?   No   During the past year, have you received a transfusion of blood or blood    products, or been given immune (gamma) globulin or antiviral drug?   No   For women: Are you pregnant or is there a chance you could become       pregnant during the next month?   No   Have you received any vaccinations in the past 4 weeks?   No     Immunization questionnaire answers were all negative.      Patient instructed to remain in clinic for 15 minutes afterwards, and to report any adverse reactions.     Screening performed by Sherrell Cardoza MA on 1/15/2024 at 9:12 AM.

## 2024-12-16 ENCOUNTER — PATIENT OUTREACH (OUTPATIENT)
Dept: CARE COORDINATION | Facility: CLINIC | Age: 44
End: 2024-12-16
Payer: COMMERCIAL

## 2025-03-10 ENCOUNTER — OFFICE VISIT (OUTPATIENT)
Dept: FAMILY MEDICINE | Facility: CLINIC | Age: 45
End: 2025-03-10
Payer: COMMERCIAL

## 2025-03-10 VITALS
OXYGEN SATURATION: 99 % | HEART RATE: 71 BPM | SYSTOLIC BLOOD PRESSURE: 114 MMHG | TEMPERATURE: 98 F | WEIGHT: 177 LBS | HEIGHT: 72 IN | DIASTOLIC BLOOD PRESSURE: 79 MMHG | RESPIRATION RATE: 16 BRPM | BODY MASS INDEX: 23.98 KG/M2

## 2025-03-10 DIAGNOSIS — Z00.00 PREVENTATIVE HEALTH CARE: Primary | ICD-10-CM

## 2025-03-10 LAB
ALBUMIN SERPL BCG-MCNC: 4.6 G/DL (ref 3.5–5.2)
ALP SERPL-CCNC: 68 U/L (ref 40–150)
ALT SERPL W P-5'-P-CCNC: 16 U/L (ref 0–70)
ANION GAP SERPL CALCULATED.3IONS-SCNC: 9 MMOL/L (ref 7–15)
AST SERPL W P-5'-P-CCNC: 27 U/L (ref 0–45)
BILIRUB SERPL-MCNC: 0.5 MG/DL
BUN SERPL-MCNC: 15.4 MG/DL (ref 6–20)
CALCIUM SERPL-MCNC: 10.6 MG/DL (ref 8.8–10.4)
CHLORIDE SERPL-SCNC: 105 MMOL/L (ref 98–107)
CREAT SERPL-MCNC: 1.12 MG/DL (ref 0.67–1.17)
EGFRCR SERPLBLD CKD-EPI 2021: 84 ML/MIN/1.73M2
GLUCOSE SERPL-MCNC: 63 MG/DL (ref 70–99)
HCO3 SERPL-SCNC: 27 MMOL/L (ref 22–29)
POTASSIUM SERPL-SCNC: 4.2 MMOL/L (ref 3.4–5.3)
PROT SERPL-MCNC: 7.4 G/DL (ref 6.4–8.3)
SODIUM SERPL-SCNC: 141 MMOL/L (ref 135–145)

## 2025-03-10 PROCEDURE — 3078F DIAST BP <80 MM HG: CPT | Performed by: FAMILY MEDICINE

## 2025-03-10 PROCEDURE — 36415 COLL VENOUS BLD VENIPUNCTURE: CPT | Performed by: FAMILY MEDICINE

## 2025-03-10 PROCEDURE — 90746 HEPB VACCINE 3 DOSE ADULT IM: CPT | Performed by: FAMILY MEDICINE

## 2025-03-10 PROCEDURE — 80053 COMPREHEN METABOLIC PANEL: CPT | Performed by: FAMILY MEDICINE

## 2025-03-10 PROCEDURE — 90471 IMMUNIZATION ADMIN: CPT | Performed by: FAMILY MEDICINE

## 2025-03-10 PROCEDURE — 99396 PREV VISIT EST AGE 40-64: CPT | Mod: 25 | Performed by: FAMILY MEDICINE

## 2025-03-10 PROCEDURE — 1126F AMNT PAIN NOTED NONE PRSNT: CPT | Performed by: FAMILY MEDICINE

## 2025-03-10 PROCEDURE — 3074F SYST BP LT 130 MM HG: CPT | Performed by: FAMILY MEDICINE

## 2025-03-10 SDOH — HEALTH STABILITY: PHYSICAL HEALTH: ON AVERAGE, HOW MANY MINUTES DO YOU ENGAGE IN EXERCISE AT THIS LEVEL?: 30 MIN

## 2025-03-10 SDOH — HEALTH STABILITY: PHYSICAL HEALTH: ON AVERAGE, HOW MANY DAYS PER WEEK DO YOU ENGAGE IN MODERATE TO STRENUOUS EXERCISE (LIKE A BRISK WALK)?: 4 DAYS

## 2025-03-10 ASSESSMENT — SOCIAL DETERMINANTS OF HEALTH (SDOH): HOW OFTEN DO YOU GET TOGETHER WITH FRIENDS OR RELATIVES?: ONCE A WEEK

## 2025-03-10 ASSESSMENT — PAIN SCALES - GENERAL: PAINLEVEL_OUTOF10: NO PAIN (0)

## 2025-03-10 NOTE — PROGRESS NOTES
Preventive Care Visit  [facility]  Harshil Cadet MD, Family Medicine  Mar 10, 2025      Assessment and Plan    (Z00.00) Preventative health care  (primary encounter diagnosis)  Comment:   Plan: Comprehensive metabolic panel (BMP + Alb, Alk         Phos, ALT, AST, Total. Bili, TP)              RTC in 1y    Harshil Cadet MD     Brina Leblanc is a 44 year old, presenting for the following:  Physical        3/10/2025     8:10 AM   Additional Questions   Roomed by Anamika Caban [facility]        Pt is not fasting in case of labs.    No additional concerns.    HPI           Advance Care Planning  Patient does not have a Health Care Directive: Discussed advance care planning with patient; however, patient declined at this time.      3/10/2025   General Health   How would you rate your overall physical health? Good   Feel stress (tense, anxious, or unable to sleep) Only a little   (!) STRESS CONCERN      3/10/2025   Nutrition   Three or more servings of calcium each day? Yes   Diet: I don't know   How many servings of fruit and vegetables per day? (!) 0-1   How many sweetened beverages each day? (!) 2         3/10/2025   Exercise   Days per week of moderate/strenous exercise 4 days   Average minutes spent exercising at this level 30 min         3/10/2025   Social Factors   Frequency of gathering with friends or relatives Once a week   Worry food won't last until get money to buy more No   Food not last or not have enough money for food? No   Do you have housing? (Housing is defined as stable permanent housing and does not include staying ouside in a car, in a tent, in an abandoned building, in an overnight shelter, or couch-surfing.) Yes   Are you worried about losing your housing? Yes   Lack of transportation? No   Unable to get utilities (heat,electricity)? No   Want help with housing or utility concern? No   (!) HOUSING CONCERN PRESENT      3/10/2025   Dental   Dentist two times every year? Yes       "      Today's PHQ-2 Score:       3/10/2025     8:09 AM   PHQ-2 ( 1999 [employer])   Q1: Little interest or pleasure in doing things 1   Q2: Feeling down, depressed or hopeless 1   PHQ-2 Score 2    Q1: Little interest or pleasure in doing things Several days   Q2: Feeling down, depressed or hopeless Several days   PHQ-2 Score 2       Patient-reported           3/10/2025   Substance Use   Alcohol more than 3/day or more than 7/wk No   Do you use any other substances recreationally? No     Social History     Tobacco Use     Smoking status: Never     Smokeless tobacco: Never     Tobacco comments:     second hand smoke   Vaping Use     Vaping status: Never Used   Substance Use Topics     Alcohol use: Yes     Alcohol/week: 1.0 standard drink of alcohol     Types: 1 Standard drinks or equivalent per week     Comment: socially     Drug use: No           3/10/2025   STI Screening   New sexual partner(s) since last STI/HIV test? No   ASCVD Risk   The 10-year ASCVD risk score (Lisandra ZAMBRANO, et al., 2019) is: 2.7%    Values used to calculate the score:      Age: 44 years      Sex: Male      Is Non- : Yes      Diabetic: No      Tobacco smoker: No      Systolic Blood Pressure: 114 mmHg      Is BP treated: No      HDL Cholesterol: 44 mg/dL      Total Cholesterol: 156 mg/dL        3/10/2025   Contraception/Family Planning   Questions about contraception or family planning No        Reviewed and updated as needed this visit by Provider                           Objective    Exam  /79 (BP Location: Right arm, Patient Position: Sitting, Cuff Size: Adult Large)   Pulse 71   Temp 98  F (36.7  C) (Oral)   Resp 16   Ht 1.816 m (5' 11.5\")   Wt 80.3 kg (177 lb)   SpO2 99%   BMI 24.34 kg/m     Estimated body mass index is 24.34 kg/m  as calculated from the following:    Height as of this encounter: 1.816 m (5' 11.5\").    Weight as of this encounter: 80.3 kg (177 lb).    Physical Exam  Vitals and nursing " note reviewed.   Constitutional:       Appearance: He is well-developed.   HENT:      Head: Normocephalic and atraumatic.      Right Ear: Tympanic membrane, ear canal and external ear normal.      Left Ear: Tympanic membrane, ear canal and external ear normal.   Eyes:      Pupils: Pupils are equal, round, and reactive to light.   Neck:      Thyroid: No thyromegaly.   Cardiovascular:      Rate and Rhythm: Normal rate and regular rhythm.      Heart sounds: Normal heart sounds.   Pulmonary:      Effort: Pulmonary effort is normal.      Breath sounds: Normal breath sounds.   Abdominal:      General: Bowel sounds are normal.      Palpations: Abdomen is soft. There is no mass.   Musculoskeletal:         General: Normal range of motion.   Lymphadenopathy:      Cervical: No cervical adenopathy.   Skin:     General: Skin is warm and dry.      Findings: No rash.   Neurological:      Mental Status: He is alert and oriented to person, place, and time.   Psychiatric:         Judgment: Judgment normal.           Signed Electronically by: Harshil Cadet MD

## 2025-04-22 ENCOUNTER — OFFICE VISIT (OUTPATIENT)
Dept: OPTOMETRY | Facility: CLINIC | Age: 45
End: 2025-04-22
Payer: COMMERCIAL

## 2025-04-22 DIAGNOSIS — Z01.00 EXAMINATION OF EYES AND VISION: ICD-10-CM

## 2025-04-22 DIAGNOSIS — H52.222 REGULAR ASTIGMATISM OF LEFT EYE: Primary | ICD-10-CM

## 2025-04-22 PROCEDURE — 92015 DETERMINE REFRACTIVE STATE: CPT | Performed by: OPTOMETRIST

## 2025-04-22 PROCEDURE — 92004 COMPRE OPH EXAM NEW PT 1/>: CPT | Performed by: OPTOMETRIST

## 2025-04-22 ASSESSMENT — TONOMETRY
OD_IOP_MMHG: 18
OS_IOP_MMHG: 17
IOP_METHOD: APPLANATION

## 2025-04-22 ASSESSMENT — VISUAL ACUITY
OS_SC: 20/30
OS_SC: 20/30-1
METHOD: SNELLEN - LINEAR
OD_SC: 20/30-1
OD_SC: 20/30

## 2025-04-22 ASSESSMENT — REFRACTION_MANIFEST
OS_AXIS: 070
OD_AXIS: 030
OS_SPHERE: -1.25
OS_CYLINDER: +1.00
OS_SPHERE: -0.75
OD_ADD: +0.50
OD_SPHERE: -0.25
OD_CYLINDER: +0.25
OD_CYLINDER: SPHERE
OS_AXIS: 050
OD_SPHERE: PLANO
METHOD_AUTOREFRACTION: 1
OS_CYLINDER: +0.75

## 2025-04-22 ASSESSMENT — CUP TO DISC RATIO
OD_RATIO: 0.5
OS_RATIO: 0.45

## 2025-04-22 ASSESSMENT — KERATOMETRY
OS_K2POWER_DIOPTERS: 44.12
OD_K1POWER_DIOPTERS: 42.50
OS_K1POWER_DIOPTERS: 42.25
OD_AXISANGLE2_DEGREES: 2
OD_K2POWER_DIOPTERS: 43.37
OS_AXISANGLE2_DEGREES: 149
OS_AXISANGLE_DEGREES: 59
OD_AXISANGLE_DEGREES: 92

## 2025-04-22 ASSESSMENT — CONF VISUAL FIELD
OD_INFERIOR_NASAL_RESTRICTION: 0
OD_SUPERIOR_NASAL_RESTRICTION: 0
OS_NORMAL: 1
OS_SUPERIOR_NASAL_RESTRICTION: 0
OD_INFERIOR_TEMPORAL_RESTRICTION: 0
OD_NORMAL: 1
OD_SUPERIOR_TEMPORAL_RESTRICTION: 0
METHOD: COUNTING FINGERS
OS_INFERIOR_TEMPORAL_RESTRICTION: 0
OS_SUPERIOR_TEMPORAL_RESTRICTION: 0
OS_INFERIOR_NASAL_RESTRICTION: 0

## 2025-04-22 ASSESSMENT — SLIT LAMP EXAM - LIDS
COMMENTS: NORMAL
COMMENTS: NORMAL

## 2025-04-22 ASSESSMENT — REFRACTION: OD_SPHERE: SAME

## 2025-04-22 ASSESSMENT — EXTERNAL EXAM - LEFT EYE: OS_EXAM: NORMAL

## 2025-04-22 ASSESSMENT — EXTERNAL EXAM - RIGHT EYE: OD_EXAM: NORMAL

## 2025-04-22 NOTE — PROGRESS NOTES
Chief Complaint   Patient presents with    Annual Eye Exam        Last Eye Exam: 04/2021  Dilated Previously: Yes, side effects of dilation explained today    What are you currently using to see?  does not use glasses or contacts       Distance Vision Acuity: Satisfied with vision    Near Vision Acuity: Satisfied with vision while reading and using computer unaided    Eye Comfort: good  Do you use eye drops? : No      Yeni Kinsey - Optometric Assistant         No fohx     Medical, surgical and family histories reviewed and updated 4/22/2025.       OBJECTIVE: See Ophthalmology exam    ASSESSMENT:    ICD-10-CM    1. Regular astigmatism of left eye  H52.222       2. Examination of eyes and vision  Z01.00         No glasses needed   PLAN:   Recheck in 2 y  Shruthi Chavez OD

## 2025-04-22 NOTE — LETTER
4/22/2025      Ti Peoples  [address] [address] [place] [address]      Dear Colleague,    [facility] you [facility]oples[facility]. Please see a copy of my visit note below.    Chief Complaint   Patient presents with     Annual Eye Exam        Last Eye Exam: 04/2021  Dilated Previously: Yes, side effects of dilation explained today    What are you currently using to see?  does not use glasses or contacts       Distance Vision Acuity: Satisfied with vision    Near Vision Acuity: Satisfied with vision while reading and using computer unaided    Eye Comfort: good  Do you use eye drops? : No      Yeni Kinsey - Optometric Assistant         No fohx     Medical, surgical and family histories reviewed and updated 4/22/2025.       OBJECTIVE: See Ophthalmology exam    ASSESSMENT:    ICD-10-CM    1. Regular astigmatism of left eye  H52.222       2. Examination of eyes and vision  Z01.00         No glasses needed   PLAN:   Recheck in 2 y  Shruthi Chavez OD     Again, thank you for allowing me to participate in the care of your patient.        Sincerely,        Shruthi Chavez, OD    Electronically signed

## 2025-06-27 ENCOUNTER — ANCILLARY PROCEDURE (OUTPATIENT)
Dept: ULTRASOUND IMAGING | Facility: CLINIC | Age: 45
End: 2025-06-27
Attending: NURSE PRACTITIONER
Payer: COMMERCIAL

## 2025-06-27 DIAGNOSIS — R19.09 ABDOMINAL MASS OF OTHER SITE: ICD-10-CM

## 2025-06-27 PROCEDURE — 76882 US LMTD JT/FCL EVL NVASC XTR: CPT | Mod: RT

## 2025-06-30 ENCOUNTER — RESULTS FOLLOW-UP (OUTPATIENT)
Dept: FAMILY MEDICINE | Facility: CLINIC | Age: 45
End: 2025-06-30